# Patient Record
Sex: FEMALE
[De-identification: names, ages, dates, MRNs, and addresses within clinical notes are randomized per-mention and may not be internally consistent; named-entity substitution may affect disease eponyms.]

---

## 2020-06-08 ENCOUNTER — HOSPITAL ENCOUNTER (INPATIENT)
Dept: HOSPITAL 88 - ER | Age: 55
LOS: 3 days | Discharge: HOME | DRG: 639 | End: 2020-06-11
Attending: INTERNAL MEDICINE | Admitting: INTERNAL MEDICINE
Payer: COMMERCIAL

## 2020-06-08 VITALS — HEIGHT: 60 IN | WEIGHT: 200 LBS | BODY MASS INDEX: 39.27 KG/M2

## 2020-06-08 DIAGNOSIS — E11.10: Primary | ICD-10-CM

## 2020-06-08 DIAGNOSIS — E11.40: ICD-10-CM

## 2020-06-08 DIAGNOSIS — R51: ICD-10-CM

## 2020-06-08 DIAGNOSIS — Z79.4: ICD-10-CM

## 2020-06-08 DIAGNOSIS — I16.0: ICD-10-CM

## 2020-06-08 DIAGNOSIS — E66.9: ICD-10-CM

## 2020-06-08 LAB
ALBUMIN SERPL-MCNC: 3.7 G/DL (ref 3.5–5)
ALBUMIN/GLOB SERPL: 0.8 {RATIO} (ref 0.8–2)
ALP SERPL-CCNC: 151 IU/L (ref 40–150)
ALT SERPL-CCNC: 76 IU/L (ref 0–55)
AMYLASE SERPL-CCNC: 20 U/L (ref 25–125)
ANION GAP SERPL CALC-SCNC: 19 MMOL/L (ref 8–16)
BACTERIA URNS QL MICRO: (no result) /HPF
BASOPHILS # BLD AUTO: 0 10*3/UL (ref 0–0.1)
BASOPHILS NFR BLD AUTO: 0.4 % (ref 0–1)
BILIRUB UR QL: NEGATIVE
BUN SERPL-MCNC: 9 MG/DL (ref 7–26)
BUN/CREAT SERPL: 8 (ref 6–25)
CALCIUM SERPL-MCNC: 9.6 MG/DL (ref 8.4–10.2)
CHLORIDE SERPL-SCNC: 100 MMOL/L (ref 98–107)
CHOLEST SERPL-MCNC: 167 MD/DL (ref 0–199)
CHOLEST/HDLC SERPL: 2.6 {RATIO} (ref 3–3.6)
CK MB SERPL-MCNC: 0.7 NG/ML (ref 0–5)
CK SERPL-CCNC: 54 IU/L (ref 29–168)
CLARITY UR: (no result)
CO2 SERPL-SCNC: 21 MMOL/L (ref 22–29)
COLOR UR: YELLOW
DEPRECATED NEUTROPHILS # BLD AUTO: 6.9 10*3/UL (ref 2.1–6.9)
DEPRECATED RBC URNS MANUAL-ACNC: (no result) /HPF (ref 0–5)
EGFRCR SERPLBLD CKD-EPI 2021: 51 ML/MIN (ref 60–?)
EOSINOPHIL # BLD AUTO: 0.1 10*3/UL (ref 0–0.4)
EOSINOPHIL NFR BLD AUTO: 1.2 % (ref 0–6)
EPI CELLS URNS QL MICRO: (no result) /LPF
ERYTHROCYTE [DISTWIDTH] IN CORD BLOOD: 12.4 % (ref 11.7–14.4)
GLOBULIN PLAS-MCNC: 4.7 G/DL (ref 2.3–3.5)
GLUCOSE SERPLBLD-MCNC: 470 MG/DL (ref 74–118)
HCT VFR BLD AUTO: 43.6 % (ref 34.2–44.1)
HDLC SERPL-MSCNC: 65 MG/DL (ref 40–60)
HGB BLD-MCNC: 14.8 G/DL (ref 12–16)
KETONES UR QL STRIP.AUTO: (no result)
LDLC SERPL CALC-MCNC: 62 MG/DL (ref 60–130)
LEUKOCYTE ESTERASE UR QL STRIP.AUTO: (no result)
LIPASE SERPL-CCNC: 22 U/L (ref 8–78)
LYMPHOCYTES # BLD: 3 10*3/UL (ref 1–3.2)
LYMPHOCYTES NFR BLD AUTO: 27.9 % (ref 18–39.1)
MAGNESIUM SERPL-MCNC: 1.4 MG/DL (ref 1.3–2.1)
MCH RBC QN AUTO: 27.8 PG (ref 28–32)
MCHC RBC AUTO-ENTMCNC: 33.9 G/DL (ref 31–35)
MCV RBC AUTO: 82 FL (ref 81–99)
MONOCYTES # BLD AUTO: 0.6 10*3/UL (ref 0.2–0.8)
MONOCYTES NFR BLD AUTO: 5.2 % (ref 4.4–11.3)
NEUTS SEG NFR BLD AUTO: 65 % (ref 38.7–80)
NITRITE UR QL STRIP.AUTO: POSITIVE
PLATELET # BLD AUTO: 230 X10E3/UL (ref 140–360)
POTASSIUM SERPL-SCNC: 3 MMOL/L (ref 3.5–5.1)
PROT UR QL STRIP.AUTO: NEGATIVE
RBC # BLD AUTO: 5.32 X10E6/UL (ref 3.6–5.1)
SODIUM SERPL-SCNC: 137 MMOL/L (ref 136–145)
SP GR UR STRIP: 1.02 (ref 1.01–1.02)
TRIGL SERPL-MCNC: 202 MG/DL (ref 0–149)
UROBILINOGEN UR STRIP-MCNC: 0.2 MG/DL (ref 0.2–1)
WBC #/AREA URNS HPF: (no result) /HPF (ref 0–5)

## 2020-06-08 PROCEDURE — 83690 ASSAY OF LIPASE: CPT

## 2020-06-08 PROCEDURE — 82553 CREATINE MB FRACTION: CPT

## 2020-06-08 PROCEDURE — 84443 ASSAY THYROID STIM HORMONE: CPT

## 2020-06-08 PROCEDURE — 36415 COLL VENOUS BLD VENIPUNCTURE: CPT

## 2020-06-08 PROCEDURE — 80053 COMPREHEN METABOLIC PANEL: CPT

## 2020-06-08 PROCEDURE — 83036 HEMOGLOBIN GLYCOSYLATED A1C: CPT

## 2020-06-08 PROCEDURE — 84484 ASSAY OF TROPONIN QUANT: CPT

## 2020-06-08 PROCEDURE — 93005 ELECTROCARDIOGRAM TRACING: CPT

## 2020-06-08 PROCEDURE — 99284 EMERGENCY DEPT VISIT MOD MDM: CPT

## 2020-06-08 PROCEDURE — 85025 COMPLETE CBC W/AUTO DIFF WBC: CPT

## 2020-06-08 PROCEDURE — 70551 MRI BRAIN STEM W/O DYE: CPT

## 2020-06-08 PROCEDURE — 82150 ASSAY OF AMYLASE: CPT

## 2020-06-08 PROCEDURE — 84439 ASSAY OF FREE THYROXINE: CPT

## 2020-06-08 PROCEDURE — 87635 SARS-COV-2 COVID-19 AMP PRB: CPT

## 2020-06-08 PROCEDURE — 82948 REAGENT STRIP/BLOOD GLUCOSE: CPT

## 2020-06-08 PROCEDURE — 87086 URINE CULTURE/COLONY COUNT: CPT

## 2020-06-08 PROCEDURE — 80048 BASIC METABOLIC PNL TOTAL CA: CPT

## 2020-06-08 PROCEDURE — 82550 ASSAY OF CK (CPK): CPT

## 2020-06-08 PROCEDURE — 83880 ASSAY OF NATRIURETIC PEPTIDE: CPT

## 2020-06-08 PROCEDURE — 80061 LIPID PANEL: CPT

## 2020-06-08 PROCEDURE — 81001 URINALYSIS AUTO W/SCOPE: CPT

## 2020-06-08 PROCEDURE — 83735 ASSAY OF MAGNESIUM: CPT

## 2020-06-08 PROCEDURE — 71045 X-RAY EXAM CHEST 1 VIEW: CPT

## 2020-06-08 RX ADMIN — SODIUM CHLORIDE PRN MG: 900 INJECTION INTRAVENOUS at 20:13

## 2020-06-08 NOTE — DIAGNOSTIC IMAGING REPORT
EXAM:  CHEST SINGLE (NOT PORTABLE)



DATE: 6/8/2020 6:30 PM  



INDICATION: Dizziness, weakness   

^ERMD ORDER

^[id number]

^1830

^Y  



COMPARISON: None



FINDINGS:



Lines and tubes: None



Heart size normal allowing for low lung volumes.



No focal pulmonary opacity, pleural effusion or pneumothorax.



Upper abdomen unremarkable. No acute bony abnormality.



IMPRESSION:

No evidence for acute disease.



Signed by: Dr. Wade Rogers M.D. on 6/8/2020 8:09 PM

## 2020-06-08 NOTE — XMS REPORT
Continuity of Care Document

                             Created on: 2020



CHARLEE REYNOSO

External Reference #: [id number]

: 1965

Sex: Female



Demographics





                          Address                   [address] [place]  [address]

 

                          Home Phone                [phone]

 

                          Preferred Language        English

 

                          Marital Status            Unknown

 

                          Nondenominational Affiliation     Unknown

 

                          Race                      Unknown

 

                          Ethnic Group              Unknown





Author





                          Author                    [facility]

 

                          Organization              [facility]

 

                          Address                   [address]

[place]  [address]



 

                          Phone                     Unavailable







Support





                Name            Relationship    Address         Phone

 

                    FERNANDO PRADO      PRS                 [address]

[address]

[place]  [address]                     [phone]

 

                    FORREST PRADO      PRS                 [address]

[place], [place]  [address] [phone]

 

                    FERNANDO PRADO      PRS                 [address]

[address]

[place]  [address]                     [phone]

 

                    FORREST PRADO      PRS                 [address]

[place], [place]  [address] [phone]







Care Team Providers





                    Care Team Member Name Role                Phone

 

                    RONALD BELCHER MD Attphys             Unavailable

 

                    INFANTE,  CHARAN        Admphys             Unavailable







Payers





           Payer Name Policy Type Policy Number Effective Date Expiration Date S

ource







Problems

This patient has no known problems.



Allergies, Adverse Reactions, Alerts





        Allergy Name Allergy Type Status  Severity Reaction(s) Onset Date Inacti

ve Date 

Treating Clinician        Comments                  Source

 

       No Known Allergies DA     Active U             2016 00:00:00       

               [facility]







Medications

This patient has no known medications.



Procedures

This patient has no known procedures.



Results





           Test Description Test Time  Test Comments Results    Result Comments 

Source

 

                CHEST SINGLE (NOT PORTABLE) 2020 20:08:00                 

                              

                                                       [facility]                        [address], [place] [address]      Patient Name: CHARLEE REYNOSO                    
           MR #: [id number]                  : 1965                    
              Age/Sex: 55/F  Acct #: [id number]                              
Req #: [id number]  Adm Physician:                                               
      Ordered by: RONALD BELCHER MD, MD                         Report #: [id number]
[id number]        Location: ER                                      Room/Bed:         
         
________________________________________________________________________________

___________________    Procedure: [id number] DX/CHEST SINGLE (NOT PORTABLE)  Exam
Date: 20                            Exam Time:                        
                      REPORT STATUS: Signed    EXAM:  CHEST SINGLE (NOT 
PORTABLE)      DATE: 2020 6:30 PM        INDICATION: Dizziness, weakness    
  ERMD ORDER    2020    Y        COMPARISON: None      FINDINGS:    
 Lines and tubes: None      Heart size normal allowing for low lung volumes.    
 No focal pulmonary opacity, pleural effusion or pneumothorax.      Upper 
abdomen unremarkable. No acute bony abnormality.      IMPRESSION:   No evidence 
for acute disease.      Signed by: Dr. Arthur Rogers M.D. on 2020 8:09 PM  
     Dictated By: RATHUR ROGERS MD  Electronically Signed By: ARTHUR ROGERS MD 
on 20  Transcribed By: NUBIA on 20       COPY TO:   
RONALD BELCHER                                       

 

                GASTRIC,BIOPSY  2019-10-03 12:23:00                 

--------------------------------------------------------------------------------

------------RUN DATE: 10/03/19                         [facility]           
              PAGE 1   RUN TIME: 1223                            Specimen 
Inquiry                    RUN USER: INTERFACE                                  
                        
----------------------------------------------------------------
----------------------------PATIENT: CHARLEE OSUNA           ACCT #: 
[id number] LOC:  ALYSSA     U #: [id number]                                   
   AGE/SX: 54/F         ROOM: [facility]     RE19REG DR:  Corrie Cordero       :    [birth year]     BED:  A          DIS: 19               
                       STATUS: DIS IN       TLOC:           
----------------------------
---------------------------------------------------------------- SPEC #: 
[id number]     RECD:      STATUS:  [place]ANE           REQ #: 
[id number]                           [place]:      [facility] DR: 
Franko Joel MD    ENTERED:      SP TYPE: GASTRIC BX   
 OTHR DR: Darrian Sommers MD, David N MDORDERED:  GROSS                               
                                              COPIES TO:   Darrian Sommers MD   [address] [place] [address]   [phone]    Franko Joel MD   [address]   [place], [place] [address] [phone]    Eulogio Carrillo MD   
[address]   [place] [address]   [phone] MARKERS: 
INTRADEPARTMENTAL CONSULT PROCEDURES: GROSS () TISSUES:           
ANTRUM - NODULE BX COLD         CLINICAL HISTORY    COLLECTION DATE: 2019 
     ABDOMINAL PAIN, NAUSEA; VOMITING   POST-OP DIAGNOSIS: GASTRITIS, HIATAL 
HERNIA, GASTROPARESIS             COMMENT   Multiple levels of the tissue show 
an area in which the mucosal surface is intact.  Beneath the mucosal lining is 
an expanded are of inflamed granulation tissue.  No  discrete areas of mucosal 
erosion/ulceration are identified in the biopsy sample.  Features diagnostic of 
malignancy are not present.  Correlation is necessary.     Intradepartmental 
consultation: DMW.                                  ** CONTINUED ON NEXT PAGE **
 
--------------------------------------------------------------------------------

------------RUN DATE: 10/03/19                         [facility]           
              PAGE 2   RUN TIME: 1223                            Specimen 
Inquiry                    RUN USER: INTERFACE                                  
                        ------
--------------------------------------------------------------------------------

------SPEC #: [id number]    PATIENT: CHARLEE OSUNA            
#[id number]  (Continued)------------------------
--------------------------------------------------------------------          
FINAL DIAGNOSIS    Antral nodule, biopsy:        PROMINENT AREA OF INFLAMED 
GRANULATION TISSUE IN ANTRAL MUCOSA,           see comment        NEGATIVE FOR 
INTESTINAL METAPLASIA        NEGATIVE FOR HELICOBACTER ORGANISMS        NEGATIVE
FOR MALIGNANCY        MULTIPLE LEVELS EXAMINED           RRB/   D   [address], 
[address]           MACROSCOPIC    The specimen is received in formalin, labeled 
with the patient's name,   identified as "antrum nodule", and consists of tan 
biopsy tissue measuring 0.3   cm, submitted for H E and Giemsa stains.       
GROSS PERFORMED AT [facility]   [address] [place] [address]   [phone])[phone]       
   MICROSCOPIC    All of the stains, including any controls performed, stain 
appropriately.       MICROSCOPIC PERFORMED AT [facility]   [address] [place] [address]   [phone])[phone]
        PERFORMING SITE    Diagnosis performed at:        [facility]        [address] [place] [address]        [phone]
[phone]

---------------------- Signed SIGNATURE ON FILE                        
Nico De La Fuente MD 10/03/19 1223  ----------------
----------------------------------------------------------------------------    
                               ** END OF REPORT **                             

 

                    GLUBED              2019 04:55:00   

 

                                        Test Item

 

             GLUBED (test code = GLUBED) 84 mg/dL            N            

Performed by certified  at

[facility]





[id number] 21:30:00* 



             Test Item    Value        Reference Range Interpretation Comments

 

             GLUBED (test code = GLUBED) 133 mg/dL           H            

Performed by certified  

at [facility]





[id number] 17:36:00* 



             Test Item    Value        Reference Range Interpretation Comments

 

             GLUBED (test code = GLUBED) 127 mg/dL           H            

Performed by certified  

at [facility]





[id number] 13:00:00* 



             Test Item    Value        Reference Range Interpretation Comments

 

             GLUBED (test code = GLUBED) 206 mg/dL           H            

Performed by certified  

at [facility]





BASIC METABOLIC [id number] 07:36:00* 



             Test Item    Value        Reference Range Interpretation Comments

 

             SODIUM (test code = NA) 141 mmol/L   136-145      N             

 

             POTASSIUM (test code = K) 3.3 mmol/L   3.5-5.1      L             

 

             CHLORIDE (test code = CL) 115.0 mmol/L        H             

 

             CARBON DIOXIDE (test code = CO2) 19.0 mmol/L  21-32        L       

      

 

             ANION GAP (test code = GAP) 10.3         10-20        N            

 

 

             GLUCOSE (test code = GLU) 105 mg/dL           N             

 

             BLOOD UREA NITROGEN (test code = BUN) 2 mg/dL      7-18         L  

           

 

             GLOMERULAR FILTRATION RATE (test code = GFR) > 60 mL/min  >=60     

                 Estimated GFR by

using Modified MDRD formula.Chronic kidney disease is defined as either kidney 
damageor GFR <60 mL/min/1.73 m2 for >3 months.

 

             CREATININE (test code = CREAT) 0.50 mg/dL   0.55-1.02    L         

   **Note change in reference

range due to change in reagent.**

 

             BUN/CREATININE RATIO (test code = BUN/CREA) 3.7          10-20     

   L             

 

             CALCIUM (test code = CA) 8.4 mg/dL    8.5-10.1     L             





BASIC METABOLIC [id number] 07:31:00* 



             Test Item    Value        Reference Range Interpretation Comments

 

             SODIUM (test code = NA) 141 mmol/L   136-145      N             

 

             POTASSIUM (test code = K) 3.3 mmol/L   3.5-5.1      L             

 

             CHLORIDE (test code = CL) 115.0 mmol/L        H             

 

             CARBON DIOXIDE (test code = CO2)  mmol/L      21-32                

      

 

             ANION GAP (test code = GAP)              10-20                     

 

 

             GLUCOSE (test code = GLU)  mg/dL                            

 

             BLOOD UREA NITROGEN (test code = BUN)  mg/dL       7-18            

           

 

             GLOMERULAR FILTRATION RATE (test code = GFR)  mL/min      >=60     

                  

 

             CREATININE (test code = CREAT)  mg/dL       0.55-1.02              

    

 

             BUN/CREATININE RATIO (test code = BUN/CREA)              10-20     

                 

 

             CALCIUM (test code = CA)  mg/dL       8.5-10.1                   





CBC W/AUTO [id number] 07:13:00* 



             Test Item    Value        Reference Range Interpretation Comments

 

             WHITE BLOOD CELL (test code = WBC) 6.6 K/mm3    4.5-12.5     N     

        

 

             RED BLOOD CELL (test code = RBC) 4.52 mill/mm3 3.7-5.2      N      

       

 

             HEMOGLOBIN (test code = HGB) 13.0 gram/dL 11.5-15.5    N           

  

 

             HEMATOCRIT (test code = HCT) 39.5 %       36.0-46.0    N           

  

 

             MEAN CELL VOLUME (test code = MCV) 87.4 fL      80-98        N     

        

 

             MEAN CELL HGB (test code = MCH) 28.8 picogram 27.0-33.0    N       

      

 

             MEAN CELL HGB CONCETRATION (test code = MCHC) 32.9 gram/dL 33.0-36.

0    L             

 

             RED CELL DISTRIBUTION WIDTH (test code = RDW) 13.1 %       11.6-16.

2    N             

 

             RED CELL DISTRIBUTION WIDTH SD (test code = RDW-SD) 41.6 fL      37

.0-51.0    N             

 

             PLATELET COUNT (test code = PLT) 310 K/mm3    150-450      N       

      

 

             MEAN PLATELET VOLUME (test code = MPV) 9.9 fL       6.7-11.0     N 

            

 

             NEUTROPHIL % (test code = NT%) 51.9 %       39.0-69.0    N         

    

 

             IMMATURE GRANULOCYTE % (test code = IG%) 0.3 %        0.0-5.0      

N             

 

             LYMPHOCYTE % (test code = LY%) 36.2 %       25.0-55.0    N         

    

 

             MONOCYTE % (test code = MO%) 7.8 %        0.0-10.0     N           

  

 

             EOSINOPHIL % (test code = EO%) 3.5 %        0.0-5.0      N         

    

 

             BASOPHIL % (test code = BA%) 0.3 %        0.0-1.0      N           

  

 

             NUCLEATED RBC % (test code = NRBC%) 0.0 %        0-0          N    

         

 

             NEUTROPHIL # (test code = NT#) 3.42 K/mm3   1.8-7.7      N         

    

 

             IMMATURE GRANULOCYTE # (test code = IG#) 0.02 x10 3/uL 0-0.03      

 N             

 

             LYMPHOCYTE # (test code = LY#) 2.38 K/mm3   1.0-5.0      N         

    

 

             MONOCYTE # (test code = MO#) 0.51 K/mm3   0-0.8        N           

  

 

             EOSINOPHIL # (test code = EO#) 0.23 K/mm3   0.0-0.5      N         

    

 

             BASOPHIL # (test code = BA#) 0.02 K/mm3   0.0-0.2      N           

  

 

             NUCLEATED RBC # (test code = NRBC#) 0.00 K/mm3   0.0-0.1      N    

         





CBC W/AUTO [id number] 07:10:00* 



             Test Item    Value        Reference Range Interpretation Comments

 

             WHITE BLOOD CELL (test code = WBC)  K/mm3       4.5-12.5           

        

 

             RED BLOOD CELL (test code = RBC)  mill/mm3    3.7-5.2              

      

 

             HEMOGLOBIN (test code = HGB) 13.0 gram/dL 11.5-15.5    N           

  

 

             HEMATOCRIT (test code = HCT) 39.5 %       36.0-46.0    N           

  

 

             MEAN CELL VOLUME (test code = MCV)  fL          80-98              

        

 

             MEAN CELL HGB (test code = MCH)  picogram    27.0-33.0             

     

 

             MEAN CELL HGB CONCETRATION (test code = MCHC)  gram/dL     33.0-36.

0                  

 

             RED CELL DISTRIBUTION WIDTH (test code = RDW)  %           11.6-16.

2                  

 

             RED CELL DISTRIBUTION WIDTH SD (test code = RDW-SD)  fL          37

.0-51.0                  

 

             PLATELET COUNT (test code = PLT)  K/mm3       150-450              

      

 

             MEAN PLATELET VOLUME (test code = MPV)  fL          6.7-11.0       

            

 

             NEUTROPHIL % (test code = NT%)  %           39.0-69.0              

    

 

             IMMATURE GRANULOCYTE % (test code = IG%)  %           0.0-5.0      

              

 

             LYMPHOCYTE % (test code = LY%)  %           25.0-55.0              

    

 

             MONOCYTE % (test code = MO%)  %           0.0-10.0                 

  

 

             EOSINOPHIL % (test code = EO%)  %           0.0-5.0                

    

 

             BASOPHIL % (test code = BA%)  %           0.0-1.0                  

  

 

             NEUTROPHIL # (test code = NT#)  K/mm3       1.8-7.7                

    

 

             LYMPHOCYTE # (test code = LY#)  K/mm3       1.0-5.0                

    

 

             MONOCYTE # (test code = MO#)  K/mm3       0-0.8                    

  

 

             EOSINOPHIL # (test code = EO#)  K/mm3       0.0-0.5                

    

 

             BASOPHIL # (test code = BA#)  K/mm3       0.0-0.2                  

  





[id number] 05:02:00* 



             Test Item    Value        Reference Range Interpretation Comments

 

             GLUBED (test code = GLUBED) 92 mg/dL            N            

Performed by certified  at

[facility]





[id number] 20:59:00* 



             Test Item    Value        Reference Range Interpretation Comments

 

             GLUBED (test code = GLUBED) 150 mg/dL           H            

Performed by certified  

at [facility]





[id number] 17:54:00* 



             Test Item    Value        Reference Range Interpretation Comments

 

             GLUBED (test code = GLUBED) 136 mg/dL           H            

Performed by certified  

at [facility]





[id number] 11:49:00* 



             Test Item    Value        Reference Range Interpretation Comments

 

             GLUBED (test code = GLUBED) 102 mg/dL           N            

Performed by certified  

at [facility]





COMPREHENSIVE METABOLIC [id number] 07:10:00* 



             Test Item    Value        Reference Range Interpretation Comments

 

             SODIUM (test code = NA) 143 mmol/L   136-145      N             

 

             POTASSIUM (test code = K) 3.2 mmol/L   3.5-5.1      L             

 

             CHLORIDE (test code = CL) 116.0 mmol/L        H             

 

             CARBON DIOXIDE (test code = CO2) 22.0 mmol/L  21-32        N       

      

 

             ANION GAP (test code = GAP) 8.2          10-20        L            

 

 

             GLUCOSE (test code = GLU) 107 mg/dL           H             

 

             BLOOD UREA NITROGEN (test code = BUN) 1 mg/dL      7-18         L  

           

 

             GLOMERULAR FILTRATION RATE (test code = GFR) > 60 mL/min  >=60     

                 Estimated GFR by

using Modified MDRD formula.Chronic kidney disease is defined as either kidney 
damageor GFR <60 mL/min/1.73 m2 for >3 months.

 

             CREATININE (test code = CREAT) 0.60 mg/dL   0.55-1.02    N         

   **Note change in reference

range due to change in reagent.**

 

             BUN/CREATININE RATIO (test code = BUN/CREA) 1.6          10-20     

   L             

 

             TOTAL PROTEIN (test code = PROT) 6.1 gram/dL  6.4-8.2      L       

      

 

             ALBUMIN (test code = ALB) 1.9 g/dL     3.4-5.0      L             

 

             GLOBULIN (test code = GLOB) 4.2 gram/dL  2.7-4.2      N            

 

 

             ALBUMIN/GLOBULIN RATIO (test code = A/G) 0.5          0.75-1.50    

L             

 

             CALCIUM (test code = CA) 8.0 mg/dL    8.5-10.1     L             

 

             BILIRUBIN TOTAL (test code = BILT) 0.30 mg/dL   0.0-1.0      N     

        

 

             SGOT/AST (test code = AST) 29 IUnit/L   15-37        N             

 

             SGPT/ALT (test code = ALT) 23 IUnit/L   12-78        N             

 

             ALKALINE PHOSPHATASE TOTAL (test code = ALKP) 75 IUnit/L     

     N            **Note change 

in reference range due to change in reagent.**





[id number] 07:10:00* 



             Test Item    Value        Reference Range Interpretation Comments

 

             MAGNESIUM (test code = MAG) 1.9 mg/dL    1.8-2.4      N            

 





COMPREHENSIVE METABOLIC [id number] 07:04:00* 



             Test Item    Value        Reference Range Interpretation Comments

 

             SODIUM (test code = NA) 143 mmol/L   136-145      N             

 

             POTASSIUM (test code = K) 3.2 mmol/L   3.5-5.1      L             

 

             CHLORIDE (test code = CL) 116.0 mmol/L        H             

 

             CARBON DIOXIDE (test code = CO2)  mmol/L      21-32                

      

 

             ANION GAP (test code = GAP)              10-20                     

 

 

             GLUCOSE (test code = GLU)  mg/dL                            

 

             BLOOD UREA NITROGEN (test code = BUN)  mg/dL       7-18            

           

 

             GLOMERULAR FILTRATION RATE (test code = GFR)  mL/min      >=60     

                  

 

             CREATININE (test code = CREAT)  mg/dL       0.55-1.02              

    

 

             BUN/CREATININE RATIO (test code = BUN/CREA)              10-20     

                 

 

             TOTAL PROTEIN (test code = PROT)  gram/dL     6.4-8.2              

      

 

             ALBUMIN (test code = ALB)  g/dL        3.4-5.0                    

 

             GLOBULIN (test code = GLOB)  gram/dL     2.7-4.2                   

 

 

             ALBUMIN/GLOBULIN RATIO (test code = A/G)              0.75-1.50    

              

 

             CALCIUM (test code = CA)  mg/dL       8.5-10.1                   

 

             BILIRUBIN TOTAL (test code = BILT)  mg/dL       0.0-1.0            

        

 

             SGOT/AST (test code = AST)  IUnit/L     15-37                      

 

             SGPT/ALT (test code = ALT)  IUnit/L     12-78                      

 

             ALKALINE PHOSPHATASE TOTAL (test code = ALKP)  IUnit/L       

                   





[id number] 07:04:00* 



             Test Item    Value        Reference Range Interpretation Comments

 

             MAGNESIUM (test code = MAG)  mg/dL       1.8-2.4                   

 





CBC W/AUTO [id number] 06:49:00* 



             Test Item    Value        Reference Range Interpretation Comments

 

             WHITE BLOOD CELL (test code = WBC) 6.8 K/mm3    4.5-12.5     N     

        

 

             RED BLOOD CELL (test code = RBC) 4.26 mill/mm3 3.7-5.2      N      

       

 

             HEMOGLOBIN (test code = HGB) 12.2 gram/dL 11.5-15.5    N           

  

 

             HEMATOCRIT (test code = HCT) 37.8 %       36.0-46.0    N           

  

 

             MEAN CELL VOLUME (test code = MCV) 88.7 fL      80-98        N     

        

 

             MEAN CELL HGB (test code = MCH) 28.6 picogram 27.0-33.0    N       

      

 

             MEAN CELL HGB CONCETRATION (test code = MCHC) 32.3 gram/dL 33.0-36.

0    L             

 

             RED CELL DISTRIBUTION WIDTH (test code = RDW) 13.1 %       11.6-16.

2    N             

 

             RED CELL DISTRIBUTION WIDTH SD (test code = RDW-SD) 41.5 fL      37

.0-51.0    N             

 

             PLATELET COUNT (test code = PLT) 290 K/mm3    150-450      N       

      

 

             MEAN PLATELET VOLUME (test code = MPV) 9.8 fL       6.7-11.0     N 

            

 

             NEUTROPHIL % (test code = NT%) 56.7 %       39.0-69.0    N         

    

 

             IMMATURE GRANULOCYTE % (test code = IG%) 1.2 %        0.0-5.0      

N             

 

             LYMPHOCYTE % (test code = LY%) 31.0 %       25.0-55.0    N         

    

 

             MONOCYTE % (test code = MO%) 6.9 %        0.0-10.0     N           

  

 

             EOSINOPHIL % (test code = EO%) 3.8 %        0.0-5.0      N         

    

 

             BASOPHIL % (test code = BA%) 0.4 %        0.0-1.0      N           

  

 

             NUCLEATED RBC % (test code = NRBC%) 0.0 %        0-0          N    

         

 

             NEUTROPHIL # (test code = NT#) 3.85 K/mm3   1.8-7.7      N         

    

 

             IMMATURE GRANULOCYTE # (test code = IG#) 0.08 x10 3/uL 0-0.03      

 H             

 

             LYMPHOCYTE # (test code = LY#) 2.11 K/mm3   1.0-5.0      N         

    

 

             MONOCYTE # (test code = MO#) 0.47 K/mm3   0-0.8        N           

  

 

             EOSINOPHIL # (test code = EO#) 0.26 K/mm3   0.0-0.5      N         

    

 

             BASOPHIL # (test code = BA#) 0.03 K/mm3   0.0-0.2      N           

  

 

             NUCLEATED RBC # (test code = NRBC#) 0.00 K/mm3   0.0-0.1      N    

         





[id number] 05:00:00* 



             Test Item    Value        Reference Range Interpretation Comments

 

             GLUBED (test code = GLUBED) 96 mg/dL            N            

Performed by certified  at

[facility]





[id number] 20:49:00* 



             Test Item    Value        Reference Range Interpretation Comments

 

             GLUBED (test code = GLUBED) 119 mg/dL           H            

Performed by certified  

at [facility]





[id number] 16:17:00* 



             Test Item    Value        Reference Range Interpretation Comments

 

             GLUBED (test code = GLUBED) 157 mg/dL           H            

Performed by certified  

at [facility]





[id number] 11:31:00* 



             Test Item    Value        Reference Range Interpretation Comments

 

             GLUBED (test code = GLUBED) 128 mg/dL           H            

Performed by certified  

at [facility]





COMPREHENSIVE METABOLIC [id number] 08:42:00* 



             Test Item    Value        Reference Range Interpretation Comments

 

             SODIUM (test code = NA) 144 mmol/L   136-145      N             

 

             POTASSIUM (test code = K) 3.0 mmol/L   3.5-5.1      L            RE

SULT VERIFIED BY REPEAT 

ANALYSIS

 

             CHLORIDE (test code = CL) 113.0 mmol/L        H             

 

             CARBON DIOXIDE (test code = CO2) 26.0 mmol/L  21-32        N       

      

 

             ANION GAP (test code = GAP) 8.0          10-20        L            

 

 

             GLUCOSE (test code = GLU) 114 mg/dL           H             

 

             BLOOD UREA NITROGEN (test code = BUN) 2 mg/dL      7-18         L  

           

 

             GLOMERULAR FILTRATION RATE (test code = GFR) > 60 mL/min  >=60     

                 Estimated GFR by

using Modified MDRD formula.Chronic kidney disease is defined as either kidney 
damageor GFR <60 mL/min/1.73 m2 for >3 months.

 

             CREATININE (test code = CREAT) 0.60 mg/dL   0.55-1.02    N         

   **Note change in reference

range due to change in reagent.**

 

             BUN/CREATININE RATIO (test code = BUN/CREA) 3.2          10-20     

   L             

 

             TOTAL PROTEIN (test code = PROT) 6.6 gram/dL  6.4-8.2      N       

      

 

             ALBUMIN (test code = ALB) 2.1 g/dL     3.4-5.0      L             

 

             GLOBULIN (test code = GLOB) 4.5 gram/dL  2.7-4.2      H            

 

 

             ALBUMIN/GLOBULIN RATIO (test code = A/G) 0.5          0.75-1.50    

L             

 

             CALCIUM (test code = CA) 8.3 mg/dL    8.5-10.1     L             

 

             BILIRUBIN TOTAL (test code = BILT) 0.30 mg/dL   0.0-1.0      N     

        

 

             SGOT/AST (test code = AST) 30 IUnit/L   15-37        N             

 

             SGPT/ALT (test code = ALT) 22 IUnit/L   12-78        N             

 

             ALKALINE PHOSPHATASE TOTAL (test code = ALKP) 84 IUnit/L     

     N            **Note change 

in reference range due to change in reagent.**





[id number] 08:42:00* 



             Test Item    Value        Reference Range Interpretation Comments

 

             MAGNESIUM (test code = MAG) 1.5 mg/dL    1.8-2.4      L            

 





COMPREHENSIVE METABOLIC [id number] 08:36:00* 



             Test Item    Value        Reference Range Interpretation Comments

 

             SODIUM (test code = NA) 144 mmol/L   136-145      N             

 

             POTASSIUM (test code = K) 3.0 mmol/L   3.5-5.1      L            RE

SULT VERIFIED BY REPEAT 

ANALYSIS

 

             CHLORIDE (test code = CL) 113.0 mmol/L        H             

 

             CARBON DIOXIDE (test code = CO2)  mmol/L      21-32                

      

 

             ANION GAP (test code = GAP)              10-20                     

 

 

             GLUCOSE (test code = GLU)  mg/dL                            

 

             BLOOD UREA NITROGEN (test code = BUN)  mg/dL       7-18            

           

 

             GLOMERULAR FILTRATION RATE (test code = GFR)  mL/min      >=60     

                  

 

             CREATININE (test code = CREAT)  mg/dL       0.55-1.02              

    

 

             BUN/CREATININE RATIO (test code = BUN/CREA)              10-20     

                 

 

             TOTAL PROTEIN (test code = PROT)  gram/dL     6.4-8.2              

      

 

             ALBUMIN (test code = ALB)  g/dL        3.4-5.0                    

 

             GLOBULIN (test code = GLOB)  gram/dL     2.7-4.2                   

 

 

             ALBUMIN/GLOBULIN RATIO (test code = A/G)              0.75-1.50    

              

 

             CALCIUM (test code = CA)  mg/dL       8.5-10.1                   

 

             BILIRUBIN TOTAL (test code = BILT)  mg/dL       0.0-1.0            

        

 

             SGOT/AST (test code = AST)  IUnit/L     15-37                      

 

             SGPT/ALT (test code = ALT)  IUnit/L     12-78                      

 

             ALKALINE PHOSPHATASE TOTAL (test code = ALKP)  IUnit/L       

                   





[id number] 08:36:00* 



             Test Item    Value        Reference Range Interpretation Comments

 

             MAGNESIUM (test code = MAG)  mg/dL       1.8-2.4                   

 





CBC W/AUTO [id number] 08:25:00* 



             Test Item    Value        Reference Range Interpretation Comments

 

             WHITE BLOOD CELL (test code = WBC) 8.4 K/mm3    4.5-12.5     N     

        

 

             RED BLOOD CELL (test code = RBC) 4.52 mill/mm3 3.7-5.2      N      

       

 

             HEMOGLOBIN (test code = HGB) 13.1 gram/dL 11.5-15.5    N           

  

 

             HEMATOCRIT (test code = HCT) 38.6 %       36.0-46.0    N           

  

 

             MEAN CELL VOLUME (test code = MCV) 85.4 fL      80-98        N     

        

 

             MEAN CELL HGB (test code = MCH) 29.0 picogram 27.0-33.0    N       

      

 

             MEAN CELL HGB CONCETRATION (test code = MCHC) 33.9 gram/dL 33.0-36.

0    N             

 

             RED CELL DISTRIBUTION WIDTH (test code = RDW) 12.6 %       11.6-16.

2    N             

 

             RED CELL DISTRIBUTION WIDTH SD (test code = RDW-SD) 38.9 fL      37

.0-51.0    N             

 

             PLATELET COUNT (test code = PLT) 287 K/mm3    150-450      N       

      

 

             MEAN PLATELET VOLUME (test code = MPV) 10.2 fL      6.7-11.0     N 

            

 

             NEUTROPHIL % (test code = NT%) 56.0 %       39.0-69.0    N         

    

 

             IMMATURE GRANULOCYTE % (test code = IG%) 0.7 %        0.0-5.0      

N             

 

             LYMPHOCYTE % (test code = LY%) 31.9 %       25.0-55.0    N         

    

 

             MONOCYTE % (test code = MO%) 7.1 %        0.0-10.0     N           

  

 

             EOSINOPHIL % (test code = EO%) 3.9 %        0.0-5.0      N         

    

 

             BASOPHIL % (test code = BA%) 0.4 %        0.0-1.0      N           

  

 

             NUCLEATED RBC % (test code = NRBC%) 0.0 %        0-0          N    

         

 

             NEUTROPHIL # (test code = NT#) 4.71 K/mm3   1.8-7.7      N         

    

 

             IMMATURE GRANULOCYTE # (test code = IG#) 0.06 x10 3/uL 0-0.03      

 H             

 

             LYMPHOCYTE # (test code = LY#) 2.68 K/mm3   1.0-5.0      N         

    

 

             MONOCYTE # (test code = MO#) 0.60 K/mm3   0-0.8        N           

  

 

             EOSINOPHIL # (test code = EO#) 0.33 K/mm3   0.0-0.5      N         

    

 

             BASOPHIL # (test code = BA#) 0.03 K/mm3   0.0-0.2      N           

  

 

             NUCLEATED RBC # (test code = NRBC#) 0.00 K/mm3   0.0-0.1      N    

         





CBC W/AUTO [id number] 08:23:00* 



             Test Item    Value        Reference Range Interpretation Comments

 

             WHITE BLOOD CELL (test code = WBC)  K/mm3       4.5-12.5           

        

 

             RED BLOOD CELL (test code = RBC)  mill/mm3    3.7-5.2              

      

 

             HEMOGLOBIN (test code = HGB) 13.1 gram/dL 11.5-15.5    N           

  

 

             HEMATOCRIT (test code = HCT) 38.6 %       36.0-46.0    N           

  

 

             MEAN CELL VOLUME (test code = MCV)  fL          80-98              

        

 

             MEAN CELL HGB (test code = MCH)  picogram    27.0-33.0             

     

 

             MEAN CELL HGB CONCETRATION (test code = MCHC)  gram/dL     33.0-36.

0                  

 

             RED CELL DISTRIBUTION WIDTH (test code = RDW)  %           11.6-16.

2                  

 

             RED CELL DISTRIBUTION WIDTH SD (test code = RDW-SD)  fL          37

.0-51.0                  

 

             PLATELET COUNT (test code = PLT)  K/mm3       150-450              

      

 

             MEAN PLATELET VOLUME (test code = MPV)  fL          6.7-11.0       

            

 

             NEUTROPHIL % (test code = NT%)  %           39.0-69.0              

    

 

             IMMATURE GRANULOCYTE % (test code = IG%)  %           0.0-5.0      

              

 

             LYMPHOCYTE % (test code = LY%)  %           25.0-55.0              

    

 

             MONOCYTE % (test code = MO%)  %           0.0-10.0                 

  

 

             EOSINOPHIL % (test code = EO%)  %           0.0-5.0                

    

 

             BASOPHIL % (test code = BA%)  %           0.0-1.0                  

  

 

             NEUTROPHIL # (test code = NT#)  K/mm3       1.8-7.7                

    

 

             LYMPHOCYTE # (test code = LY#)  K/mm3       1.0-5.0                

    

 

             MONOCYTE # (test code = MO#)  K/mm3       0-0.8                    

  

 

             EOSINOPHIL # (test code = EO#)  K/mm3       0.0-0.5                

    

 

             BASOPHIL # (test code = BA#)  K/mm3       0.0-0.2                  

  





[id number] 05:32:00* 



             Test Item    Value        Reference Range Interpretation Comments

 

             GLUBED (test code = GLUBED) 112 mg/dL           H            

Performed by certified  

at [facility]





[id number] 21:07:00* 



             Test Item    Value        Reference Range Interpretation Comments

 

             GLUBED (test code = GLUBED) 134 mg/dL           H            

Performed by certified  

at [facility]2019-09-22 16:30:00* 



             Test Item    Value        Reference Range Interpretation Comments

 

             GLUBED (test code = GLUBED) 94 mg/dL            N            

Performed by certified  at

[facility]2019-09-22 12:00:00* 



             Test Item    Value        Reference Range Interpretation Comments

 

             GLUBED (test code = GLUBED) 102 mg/dL           N            

Performed by certified  

at [facility]2019-09-22 06:43:00* 



             Test Item    Value        Reference Range Interpretation Comments

 

             GLUBED (test code = GLUBED) 108 mg/dL           H            

Performed by certified  

at [facility]2019-09-21 20:42:00* 



             Test Item    Value        Reference Range Interpretation Comments

 

             GLUBED (test code = GLUBED) 137 mg/dL           H            

Performed by certified  

at [facility]





[id number] 16:58:00* 



             Test Item    Value        Reference Range Interpretation Comments

 

             GLUBED (test code = GLUBED) 199 mg/dL           H            

Performed by certified  

at [facility]





[id number] 16:58:00* 



             Test Item    Value        Reference Range Interpretation Comments

 

             GLUBED (test code = GLUBED) 101 mg/dL           N            

Performed by certified  

at [facility]





[id number] 16:58:00* 



             Test Item    Value        Reference Range Interpretation Comments

 

             GLUBED (test code = GLUBED) 106 mg/dL           N            

Performed by certified  

at [facility]





COMPREHENSIVE METABOLIC [id number] 08:23:00* 



             Test Item    Value        Reference Range Interpretation Comments

 

             SODIUM (test code = NA) 142 mmol/L   136-145      N             

 

             POTASSIUM (test code = K) 3.0 mmol/L   3.5-5.1      L             

 

             CHLORIDE (test code = CL) 111.0 mmol/L        H             

 

             CARBON DIOXIDE (test code = CO2) 24.0 mmol/L  21-32        N       

      

 

             ANION GAP (test code = GAP) 10.0         10-20        N            

 

 

             GLUCOSE (test code = GLU) 101 mg/dL           N             

 

             BLOOD UREA NITROGEN (test code = BUN) 4 mg/dL      7-18         L  

           

 

             GLOMERULAR FILTRATION RATE (test code = GFR) > 60 mL/min  >=60     

                 Estimated GFR by

using Modified MDRD formula.Chronic kidney disease is defined as either kidney 
damageor GFR <60 mL/min/1.73 m2 for >3 months.

 

             CREATININE (test code = CREAT) 0.60 mg/dL   0.55-1.02    N         

   **Note change in reference

range due to change in reagent.**

 

             BUN/CREATININE RATIO (test code = BUN/CREA) 7.2          10-20     

   L             

 

             TOTAL PROTEIN (test code = PROT) 6.3 gram/dL  6.4-8.2      L       

      

 

             ALBUMIN (test code = ALB) 1.8 g/dL     3.4-5.0      L             

 

             GLOBULIN (test code = GLOB) 4.5 gram/dL  2.7-4.2      H            

 

 

             ALBUMIN/GLOBULIN RATIO (test code = A/G) 0.4          0.75-1.50    

L             

 

             CALCIUM (test code = CA) 8.2 mg/dL    8.5-10.1     L             

 

             BILIRUBIN TOTAL (test code = BILT) 0.30 mg/dL   0.0-1.0      N     

        

 

             SGOT/AST (test code = AST) 36 IUnit/L   15-37        N             

 

             SGPT/ALT (test code = ALT) 26 IUnit/L   12-78        N             

 

             ALKALINE PHOSPHATASE TOTAL (test code = ALKP) 91 IUnit/L     

     N            **Note change 

in reference range due to change in reagent.**





COMPREHENSIVE METABOLIC [id number] 08:17:00* 



             Test Item    Value        Reference Range Interpretation Comments

 

             SODIUM (test code = NA) 142 mmol/L   136-145      N             

 

             POTASSIUM (test code = K) 3.0 mmol/L   3.5-5.1      L             

 

             CHLORIDE (test code = CL) 111.0 mmol/L        H             

 

             CARBON DIOXIDE (test code = CO2)  mmol/L      21-32                

      

 

             ANION GAP (test code = GAP)              10-20                     

 

 

             GLUCOSE (test code = GLU)  mg/dL                            

 

             BLOOD UREA NITROGEN (test code = BUN)  mg/dL       7-18            

           

 

             GLOMERULAR FILTRATION RATE (test code = GFR)  mL/min      >=60     

                  

 

             CREATININE (test code = CREAT)  mg/dL       0.55-1.02              

    

 

             BUN/CREATININE RATIO (test code = BUN/CREA)              10-20     

                 

 

             TOTAL PROTEIN (test code = PROT)  gram/dL     6.4-8.2              

      

 

             ALBUMIN (test code = ALB)  g/dL        3.4-5.0                    

 

             GLOBULIN (test code = GLOB)  gram/dL     2.7-4.2                   

 

 

             ALBUMIN/GLOBULIN RATIO (test code = A/G)              0.75-1.50    

              

 

             CALCIUM (test code = CA)  mg/dL       8.5-10.1                   

 

             BILIRUBIN TOTAL (test code = BILT)  mg/dL       0.0-1.0            

        

 

             SGOT/AST (test code = AST)  IUnit/L     15-37                      

 

             SGPT/ALT (test code = ALT)  IUnit/L     12-78                      

 

             ALKALINE PHOSPHATASE TOTAL (test code = ALKP)  IUnit/L       

                   





CBC W/AUTO [id number] 08:04:00* 



             Test Item    Value        Reference Range Interpretation Comments

 

             WHITE BLOOD CELL (test code = WBC) 8.1 K/mm3    4.5-12.5     N     

        

 

             RED BLOOD CELL (test code = RBC) 4.56 mill/mm3 3.7-5.2      N      

       

 

             HEMOGLOBIN (test code = HGB) 13.0 gram/dL 11.5-15.5    N           

  

 

             HEMATOCRIT (test code = HCT) 39.6 %       36.0-46.0    N           

  

 

             MEAN CELL VOLUME (test code = MCV) 86.8 fL      80-98        N     

        

 

             MEAN CELL HGB (test code = MCH) 28.5 picogram 27.0-33.0    N       

      

 

             MEAN CELL HGB CONCETRATION (test code = MCHC) 32.8 gram/dL 33.0-36.

0    L             

 

             RED CELL DISTRIBUTION WIDTH (test code = RDW) 12.5 %       11.6-16.

2    N             

 

             RED CELL DISTRIBUTION WIDTH SD (test code = RDW-SD) 39.8 fL      37

.0-51.0    N             

 

             PLATELET COUNT (test code = PLT) 257 K/mm3    150-450      N       

      

 

             MEAN PLATELET VOLUME (test code = MPV) 10.7 fL      6.7-11.0     N 

            

 

             NEUTROPHIL % (test code = NT%) 63.6 %       39.0-69.0    N         

    

 

             IMMATURE GRANULOCYTE % (test code = IG%) 0.4 %        0.0-5.0      

N             

 

             LYMPHOCYTE % (test code = LY%) 26.1 %       25.0-55.0    N         

    

 

             MONOCYTE % (test code = MO%) 6.4 %        0.0-10.0     N           

  

 

             EOSINOPHIL % (test code = EO%) 3.3 %        0.0-5.0      N         

    

 

             BASOPHIL % (test code = BA%) 0.2 %        0.0-1.0      N           

  

 

             NUCLEATED RBC % (test code = NRBC%) 0.0 %        0-0          N    

         

 

             NEUTROPHIL # (test code = NT#) 5.12 K/mm3   1.8-7.7      N         

    

 

             IMMATURE GRANULOCYTE # (test code = IG#) 0.03 x10 3/uL 0-0.03      

 N             

 

             LYMPHOCYTE # (test code = LY#) 2.11 K/mm3   1.0-5.0      N         

    

 

             MONOCYTE # (test code = MO#) 0.52 K/mm3   0-0.8        N           

  

 

             EOSINOPHIL # (test code = EO#) 0.27 K/mm3   0.0-0.5      N         

    

 

             BASOPHIL # (test code = BA#) 0.02 K/mm3   0.0-0.2      N           

  

 

             NUCLEATED RBC # (test code = NRBC#) 0.00 K/mm3   0.0-0.1      N    

         

 

             MANUAL DIFF REQUIRED (test code = MDIFF) NO                        

              





[id number] 20:47:00* 



             Test Item    Value        Reference Range Interpretation Comments

 

             GLUBED (test code = GLUBED) 142 mg/dL           H            

Performed by certified  

at [facility]





BASIC METABOLIC [id number] 19:15:00* 



             Test Item    Value        Reference Range Interpretation Comments

 

             SODIUM (test code = NA) 144 mmol/L   136-145      N             

 

             POTASSIUM (test code = K) 3.3 mmol/L   3.5-5.1      L             

 

             CHLORIDE (test code = CL) 110.0 mmol/L        H             

 

             CARBON DIOXIDE (test code = CO2) 25.0 mmol/L  21-32        N       

      

 

             ANION GAP (test code = GAP) 12.3         10-20        N            

 

 

             GLUCOSE (test code = GLU) 99 mg/dL            N             

 

             BLOOD UREA NITROGEN (test code = BUN) 5 mg/dL      7-18         L  

           

 

             GLOMERULAR FILTRATION RATE (test code = GFR) > 60 mL/min  >=60     

                 Estimated GFR by

using Modified MDRD formula.Chronic kidney disease is defined as either kidney 
damageor GFR <60 mL/min/1.73 m2 for >3 months.

 

             CREATININE (test code = CREAT) 0.50 mg/dL   0.55-1.02    L         

   **Note change in reference

range due to change in reagent.**

 

             BUN/CREATININE RATIO (test code = BUN/CREA) 10.1         10-20     

   N             

 

             CALCIUM (test code = CA) 8.2 mg/dL    8.5-10.1     L             





19 [id number] 16:58:00* 



             Test Item    Value        Reference Range Interpretation Comments

 

             GLUBED (test code = GLUBED) 112 mg/dL           H            

Performed by certified  

at [facility]





[id number] 12:27:00* 



             Test Item    Value        Reference Range Interpretation Comments

 

             GLUBED (test code = GLUBED) 135 mg/dL           H            

Performed by certified  

at [facility]





- NM GASTRIC [id number] 11:13:00 FAX: Patricia Abrams MD   
[phone]    [place]:    St: [facility] FAX: Eulogio Smith MD      
[phone]   ----------------------------------------
---------------------------------------  Name:   CHARLEE OSUNA            
 [facility]                     : 1965  Age/S: 54/F           [address] [facility]                Unit #: [id number]      Loc: [id number]       [place]  [address]              Phys: Eulogio Carrillo MD                                    
                Acct: [id number] Dis Date:               Status: ADM IN       
                         PHONE #: [phone]     Exam Date:     2019    
1100                   FAX #: [phone]     Reason: intractable emesis       
                         EXAMS:                                               
CPT CODE:      [id number] NM GASTRIC EMPTYING                        67969       
            HISTORY: intractable emesis               EXAM: NUCLEAR MEDICINE 
GASTRIC EMPTYING STUDY.               COMPARISON: No relevant prior             
 TECHNIQUE:  After patient ingested 1 mCi TC 99m sulfur colloid mixed       with
oatmeal, sequential imaging was acquired over the anterior       abdomen for 90 
minutes.               FINDINGS:       There is no significant gastric emptying 
appreciated during the study.               T 1/2 for gastric emptying was un
able to be calculated due to the       absence of any appreciable gastric emptyi
ng.                         IMPRESSION: No appreciable gastric emptying may be s
een with either         gastroparesis or gastric outlet obstruction.          **
Electronically Signed by Stephen Jauregui MD on 2019 at 1113 **                
     Reported and signed by: Stephen Jauregui MD                    CC: Cole Peña MD; Eulogio Carrillo MD                                                        
                                            Technologist: Jenny Haley RT(N)   
                                Trnscrd Date/Time/By: 2019 (1113) : By: 
[email or web address]          Orig Print D/T: S: 2019 (1116)                        
PAGE  1                       Signed Report                               GLUBED
2019 06:25:00* 



             Test Item    Value        Reference Range Interpretation Comments

 

             GLUBED (test code = GLUBED) 108 mg/dL           H            

Performed by certified  

at [facility]





[id number] 20:37:00* 



             Test Item    Value        Reference Range Interpretation Comments

 

             GLUBED (test code = GLUBED) 119 mg/dL           H            

Performed by certified  

at [facility]





[id number] 17:15:00* 



             Test Item    Value        Reference Range Interpretation Comments

 

             GLUBED (test code = GLUBED) 108 mg/dL           H            

Performed by certified  

at [facility]





[id number] 13:12:00* 



             Test Item    Value        Reference Range Interpretation Comments

 

             GLUBED (test code = GLUBED) 125 mg/dL           H            

Performed by certified  

at [facility]





COMPREHENSIVE METABOLIC [id number] 11:06:00* 



             Test Item    Value        Reference Range Interpretation Comments

 

             SODIUM (test code = NA) 141 mmol/L   136-145      N             

 

             POTASSIUM (test code = K) 3.0 mmol/L   3.5-5.1      L             

 

             CHLORIDE (test code = CL) 107.0 mmol/L        N             

 

             CARBON DIOXIDE (test code = CO2) 26.0 mmol/L  21-32        N       

      

 

             ANION GAP (test code = GAP) 11.0         10-20        N            

 

 

             GLUCOSE (test code = GLU) 131 mg/dL           H             

 

             BLOOD UREA NITROGEN (test code = BUN) 9 mg/dL      7-18         N  

           

 

             GLOMERULAR FILTRATION RATE (test code = GFR) > 60 mL/min  >=60     

                 Estimated GFR by

using Modified MDRD formula.Chronic kidney disease is defined as either kidney 
damageor GFR <60 mL/min/1.73 m2 for >3 months.

 

             CREATININE (test code = CREAT) 0.60 mg/dL   0.55-1.02    N         

   **Note change in reference

range due to change in reagent.**

 

             BUN/CREATININE RATIO (test code = BUN/CREA) 14.7         10-20     

   N             

 

             TOTAL PROTEIN (test code = PROT) 6.3 gram/dL  6.4-8.2      L       

      

 

             ALBUMIN (test code = ALB) 1.8 g/dL     3.4-5.0      L             

 

             GLOBULIN (test code = GLOB) 4.5 gram/dL  2.7-4.2      H            

 

 

             ALBUMIN/GLOBULIN RATIO (test code = A/G) 0.4          0.75-1.50    

L             

 

             CALCIUM (test code = CA) 8.7 mg/dL    8.5-10.1     N             

 

             BILIRUBIN TOTAL (test code = BILT) 0.50 mg/dL   0.0-1.0      N     

        

 

             SGOT/AST (test code = AST) 27 IUnit/L   15-37        N             

 

             SGPT/ALT (test code = ALT) 26 IUnit/L   12-78        N             

 

             ALKALINE PHOSPHATASE TOTAL (test code = ALKP) 99 IUnit/L     

     N            **Note change 

in reference range due to change in reagent.**





PT WENT TO PROCEDURE PER MIRIAM SANFORD([id number]) @V.LAB./ 0848COMPREHENSIVE
METABOLIC [id number] 10:58:00* 



             Test Item    Value        Reference Range Interpretation Comments

 

             SODIUM (test code = NA) 141 mmol/L   136-145      N             

 

             POTASSIUM (test code = K) 3.0 mmol/L   3.5-5.1      L             

 

             CHLORIDE (test code = CL) 107.0 mmol/L        N             

 

             CARBON DIOXIDE (test code = CO2)  mmol/L      21-32                

      

 

             ANION GAP (test code = GAP)              10-20                     

 

 

             GLUCOSE (test code = GLU)  mg/dL                            

 

             BLOOD UREA NITROGEN (test code = BUN)  mg/dL       7-18            

           

 

             GLOMERULAR FILTRATION RATE (test code = GFR)  mL/min      >=60     

                  

 

             CREATININE (test code = CREAT)  mg/dL       0.55-1.02              

    

 

             BUN/CREATININE RATIO (test code = BUN/CREA)              10-20     

                 

 

             TOTAL PROTEIN (test code = PROT)  gram/dL     6.4-8.2              

      

 

             ALBUMIN (test code = ALB)  g/dL        3.4-5.0                    

 

             GLOBULIN (test code = GLOB)  gram/dL     2.7-4.2                   

 

 

             ALBUMIN/GLOBULIN RATIO (test code = A/G)              0.75-1.50    

              

 

             CALCIUM (test code = CA)  mg/dL       8.5-10.1                   

 

             BILIRUBIN TOTAL (test code = BILT)  mg/dL       0.0-1.0            

        

 

             SGOT/AST (test code = AST)  IUnit/L     15-37                      

 

             SGPT/ALT (test code = ALT)  IUnit/L     12-78                      

 

             ALKALINE PHOSPHATASE TOTAL (test code = ALKP)  IUnit/L       

                   





PT WENT TO PROCEDURE PER MIRIAM SANFORD([id number]) @V.LAB.SP 0848CBC W/AUTO 
[id number] 10:37:00* 



             Test Item    Value        Reference Range Interpretation Comments

 

             WHITE BLOOD CELL (test code = WBC) 11.3 K/mm3   4.5-12.5     N     

        

 

             RED BLOOD CELL (test code = RBC) 4.26 mill/mm3 3.7-5.2      N      

       

 

             HEMOGLOBIN (test code = HGB) 12.2 gram/dL 11.5-15.5                

 RESULT VERIFIED BY REPEAT 

ANALYSIS

 

             HEMATOCRIT (test code = HCT) 37.7 %       36.0-46.0    N           

  

 

             MEAN CELL VOLUME (test code = MCV) 88.5 fL      80-98        N     

        

 

             MEAN CELL HGB (test code = MCH) 28.6 picogram 27.0-33.0    N       

      

 

             MEAN CELL HGB CONCETRATION (test code = MCHC) 32.4 gram/dL 33.0-36.

0    L             

 

             RED CELL DISTRIBUTION WIDTH (test code = RDW) 12.6 %       11.6-16.

2    N             

 

             RED CELL DISTRIBUTION WIDTH SD (test code = RDW-SD) 41.1 fL      37

.0-51.0    N             

 

             PLATELET COUNT (test code = PLT) 220 K/mm3    150-450      N       

      

 

             MEAN PLATELET VOLUME (test code = MPV) 10.7 fL      6.7-11.0     N 

            

 

             NEUTROPHIL % (test code = NT%) 80.7 %       39.0-69.0    H         

    

 

             IMMATURE GRANULOCYTE % (test code = IG%) 0.6 %        0.0-5.0      

N             

 

             LYMPHOCYTE % (test code = LY%) 10.9 %       25.0-55.0    L         

    

 

             MONOCYTE % (test code = MO%) 5.1 %        0.0-10.0     N           

  

 

             EOSINOPHIL % (test code = EO%) 2.5 %        0.0-5.0      N         

    

 

             BASOPHIL % (test code = BA%) 0.2 %        0.0-1.0      N           

  

 

             NUCLEATED RBC % (test code = NRBC%) 0.0 %        0-0          N    

         

 

             NEUTROPHIL # (test code = NT#) 9.08 K/mm3   1.8-7.7      H         

    

 

             IMMATURE GRANULOCYTE # (test code = IG#) 0.07 x10 3/uL 0-0.03      

 H             

 

             LYMPHOCYTE # (test code = LY#) 1.23 K/mm3   1.0-5.0      N         

    

 

             MONOCYTE # (test code = MO#) 0.57 K/mm3   0-0.8        N           

  

 

             EOSINOPHIL # (test code = EO#) 0.28 K/mm3   0.0-0.5      N         

    

 

             BASOPHIL # (test code = BA#) 0.02 K/mm3   0.0-0.2      N           

  

 

             NUCLEATED RBC # (test code = NRBC#) 0.00 K/mm3   0.0-0.1      N    

         





PT WENT TO [facility] PER MIRIAM SANFORD([id number]) @V.LAB. 0848- HEPA IMAG 
INCL GB W [id number] 09:16:00 FAX: Patricia Abrams MD   [phone] 
  [place]: B   St: ADM FAX: Eulogio Smith MD      [phone]   
------------------------------------------------------------------------------- 
Name:   CHARLEE OSUNA              [facility]                     :
1965  Age/S: 54/F           [address] [facility]                Unit #: 
[id number]      Loc: [address]       [place]  [address]              Phys: 
Eulogio Carrillo MD                                                     Acct: 
[id number] Dis Date:               Status: ADM IN                             
   PHONE #: [phone]     Exam Date:     2019                 
 FAX #: [phone]     Reason: post cholecystectomy pain possible bile leak   
   EXAMS:                                               CPT CODE:      [id number]
HEPA IMAG INCL GB W PHA                    [id number]                    HISTORY: 
post cholecystectomy pain possible bile leak               EXAM:  NUCLEAR 
MEDICINE HEPATOBILIARY SCAN.                TECHNIQUE: After IV injection of 5.5
mCi Tc 99m Choletec, sequential       planar images were obtained over the upper
abdomen out to 60 minutes.               FINDINGS:  Homogeneous distribution of 
radiopharmaceutical in the       liver. Gallbladder is surgically absent.. 
Normal accumulation of       radiopharmaceutical in small bowel by 15 minutes. 
No tracer uptake       into the gallbladder fossa.                 IMPRESSION:  
                  No evidence of biliary obstruction and no evidence of bile 
leak into         the peritoneal cavity.          ** Electronically Signed by 
Stephen Jauregui MD on 2019 at 0916 **                      Reported and signed
by: Stephen Jauregui MD                       CC: Patricia Peña MD; Eulogio Carrillo MD
                                                                                
                   Technologist: Jenny Haley RT(N)                            
       Trnscrd Date/Time/By: 2019 (0916) : By: [email or web address]          Orig 
Print D/T: S: 2019 (20)                         PAGE  1                 
     Signed Report                               [id number] 05:08:00* 



             Test Item    Value        Reference Range Interpretation Comments

 

             GLUBED (test code = GLUBED) 127 mg/dL           H            

Performed by certified  

at [facility]





[id number] 20:17:00* 



             Test Item    Value        Reference Range Interpretation Comments

 

             GLUBED (test code = GLUBED) 161 mg/dL           H            

Performed by certified  

at [facility]





[id number] 16:47:00* 



             Test Item    Value        Reference Range Interpretation Comments

 

             GLUBED (test code = GLUBED) 150 mg/dL           H            

Performed by certified  

at [facility]





[id number] 13:05:00* 



             Test Item    Value        Reference Range Interpretation Comments

 

             GLUBED (test code = GLUBED) 153 mg/dL           H            

Performed by certified  

at [facility]





COMPREHENSIVE METABOLIC [id number] 10:43:00* 



             Test Item    Value        Reference Range Interpretation Comments

 

             SODIUM (test code = NA) 143 mmol/L   136-145                   RESU

LT VERIFIED BY REPEAT ANALYSIS

 

             POTASSIUM (test code = K) 3.9 mmol/L   3.5-5.1      N             

 

             CHLORIDE (test code = CL) 109.0 mmol/L        H             

 

             CARBON DIOXIDE (test code = CO2) 22.0 mmol/L  21-32        N       

      

 

             ANION GAP (test code = GAP) 15.9         10-20        N            

 

 

             GLUCOSE (test code = GLU) 148 mg/dL           H             

 

             BLOOD UREA NITROGEN (test code = BUN) 11 mg/dL     7-18         N  

           

 

             GLOMERULAR FILTRATION RATE (test code = GFR) > 60 mL/min  >=60     

                 Estimated GFR by

using Modified MDRD formula.Chronic kidney disease is defined as either kidney 
damageor GFR <60 mL/min/1.73 m2 for >3 months.

 

             CREATININE (test code = CREAT) 0.80 mg/dL   0.55-1.02    N         

   **Note change in reference

range due to change in reagent.**

 

             BUN/CREATININE RATIO (test code = BUN/CREA) 13.8         10-20     

   N             

 

             TOTAL PROTEIN (test code = PROT) 6.4 gram/dL  6.4-8.2      N       

      

 

             ALBUMIN (test code = ALB) 2.2 g/dL     3.4-5.0      L             

 

             GLOBULIN (test code = GLOB) 4.2 gram/dL  2.7-4.2      N            

 

 

             ALBUMIN/GLOBULIN RATIO (test code = A/G) 0.5          0.75-1.50    

L             

 

             CALCIUM (test code = CA) 8.5 mg/dL    8.5-10.1     N             

 

             BILIRUBIN TOTAL (test code = BILT) 0.60 mg/dL   0.0-1.0      N     

        

 

             SGOT/AST (test code = AST) 39 IUnit/L   15-37        H             

 

             SGPT/ALT (test code = ALT) 31 IUnit/L   12-78        N             

 

             ALKALINE PHOSPHATASE TOTAL (test code = ALKP) 94 IUnit/L     

     N            **Note change 

in reference range due to change in reagent.**





[id number] 10:43:00* 



             Test Item    Value        Reference Range Interpretation Comments

 

             MAGNESIUM (test code = MAG) 1.6 mg/dL    1.8-2.4      L            

 





CBC W/AUTO [id number] 10:21:00* 



             Test Item    Value        Reference Range Interpretation Comments

 

             WHITE BLOOD CELL (test code = WBC) 18.4 K/mm3   4.5-12.5     H     

        

 

             RED BLOOD CELL (test code = RBC) 4.90 mill/mm3 3.7-5.2      N      

       

 

             HEMOGLOBIN (test code = HGB) 14.3 gram/dL 11.5-15.5    N           

  

 

             HEMATOCRIT (test code = HCT) 43.7 %       36.0-46.0    N           

  

 

             MEAN CELL VOLUME (test code = MCV) 89.2 fL      80-98        N     

        

 

             MEAN CELL HGB (test code = MCH) 29.2 picogram 27.0-33.0    N       

      

 

             MEAN CELL HGB CONCETRATION (test code = MCHC) 32.7 gram/dL 33.0-36.

0    L             

 

             RED CELL DISTRIBUTION WIDTH (test code = RDW) 12.7 %       11.6-16.

2    N             

 

             RED CELL DISTRIBUTION WIDTH SD (test code = RDW-SD) 41.7 fL      37

.0-51.0    N             

 

             PLATELET COUNT (test code = PLT) 214 K/mm3    150-450              

     RESULT VERIFIED BY REPEAT 

ANALYSIS

 

             MEAN PLATELET VOLUME (test code = MPV) 11.2 fL      6.7-11.0     H 

            

 

             NEUTROPHIL % (test code = NT%) 84.5 %       39.0-69.0    H         

    

 

             IMMATURE GRANULOCYTE % (test code = IG%) 0.7 %        0.0-5.0      

N             

 

             LYMPHOCYTE % (test code = LY%) 9.1 %        25.0-55.0    L         

    

 

             MONOCYTE % (test code = MO%) 4.0 %        0.0-10.0     N           

  

 

             EOSINOPHIL % (test code = EO%) 1.4 %        0.0-5.0      N         

    

 

             BASOPHIL % (test code = BA%) 0.3 %        0.0-1.0      N           

  

 

             NUCLEATED RBC % (test code = NRBC%) 0.0 %        0-0          N    

         

 

             NEUTROPHIL # (test code = NT#) 15.60 K/mm3  1.8-7.7      H         

    

 

             IMMATURE GRANULOCYTE # (test code = IG#) 0.12 x10 3/uL 0-0.03      

 H             

 

             LYMPHOCYTE # (test code = LY#) 1.67 K/mm3   1.0-5.0      N         

    

 

             MONOCYTE # (test code = MO#) 0.73 K/mm3   0-0.8        N           

  

 

             EOSINOPHIL # (test code = EO#) 0.26 K/mm3   0.0-0.5      N         

    

 

             BASOPHIL # (test code = BA#) 0.05 K/mm3   0.0-0.2      N           

  

 

             NUCLEATED RBC # (test code = NRBC#) 0.00 K/mm3   0.0-0.1      N    

         





[id number] 05:37:00* 



             Test Item    Value        Reference Range Interpretation Comments

 

             GLUBED (test code = GLUBED) 163 mg/dL           H            

Performed by certified  

at [facility]





LACTIC [id number] 23:47:00* 



             Test Item    Value        Reference Range Interpretation Comments

 

             LACTIC ACID (test code = LACT) 2.3 mmol/L   0.4-1.9      HH        

   Results called to [id number] 

by V.LAB.ROB 19 2347Critical results verified and read back by Nurse? Y





[id number] 21:23:00* 



             Test Item    Value        Reference Range Interpretation Comments

 

             GLUBED (test code = GLUBED) 202 mg/dL           H            

Performed by certified  

at [facility]





LACTIC [id number] 20:58:00* 



             Test Item    Value        Reference Range Interpretation Comments

 

             LACTIC ACID (test code = LACT) 2.3 mmol/L   0.4-1.9      HH        

   Results called to [id number] 

by V.LAB.LT 19Critical results verified and read back by Nurse? Y





LACTIC [id number] 17:29:00* 



             Test Item    Value        Reference Range Interpretation Comments

 

             LACTIC ACID (test code = LACT) 2.7 mmol/L   0.4-1.9      HH        

   Results called to TAS3495 

by V.LAB.KP1 19 1729Critical results verified and read back by Nurse? Y





- CT ABD PELVIS W/[id number] 16:48:00  Name: CHARLEE OSUNA            
  [facility]                     : 1965 Age/S: 54  / F         [address]                Unit #: [id number]     Loc:               [place]  [address]              Phys: Nehemias Day MD                              
                Acct: [id number]  Dis Date:               Status: REG ER      
                           PHONE #: [phone]     Exam Date: 2019  1638
                    FAX #: [phone]      Reason: ABD PAIN, VOMITING         
                        EXAMS:                                               CPT
CODE:      [id number] CT ABD PELVIS W/CONT                       [id number]           
                REASON FOR EXAM: ABD PAIN, VOMITING               EXAM ORDER 
DATE: 2019 2:47 PM               Ordering MRUTHANN: Nehemias Day MD      
        PROCEDURE:  - CT ABD PELVIS W/CONT               COMPARISON: 2019   
           FINDINGS: CT images of the abdomen and pelvis were obtained with IV  
    and without oral contrast at 5mm. Dose modulation, iterative       
reconstruction, and/or weight based adjustment of the MA/KV was       utilized 
to reduce the radiation dose to as low as reasonably       achievable.          
     Intravenous contrast: 100cc of Omnipaque 370.               The spleen, 
pancreas are grossly within normal limits. The liver is       diffusely 
hypodense.       The patient is status post cholecystectomy               The 
kidneys are within normal limits.  The urinary bladder is       contracted      
        The colon, small bowel, and stomach are within normal limits without    
  evidence of obstruction.  The appendix was not seen               No evidence 
of free air .  The uterus is unremarkable.                 IMPRESSION: Diffuse 
fatty infiltration of the liver. Minimal         nonspecific free fluid in the 
cul-de-sac. Previously seen right         surgical drain has been removed.      
   ** Electronically Signed by VINCENT Zavala on 2019 at 9476 **            
         Reported and signed by: Italo Zavala M.D.     CC: Nehemias Day MD    
                                                                                
                             Technologist:Susy Marin RT(R); Abby  CTDI: 
      DLP:        Trnscb Date/Time: 2019 (5851) tKAINL                 
      Orig Print D/T: S: 2019 (6840)      PAGE  1                       
Signed Report                               PROCALCITONIN (PCT)2019 
16:27:00* 



             Test Item    Value        Reference Range Interpretation Comments

 

             PROCALCITONIN (PCT) (test code = PROCAL) 1.45 ng/ml                

             Concentration   

Interpretation   (ng/mL)      -------------   
--------------------------------------------<0.51           Sepsis is not 
likely.                Local bacterial infection is possible.                
(LOW RISK for progression to Sepsis) 0.51 - 2.00     Sepsis is possible, but 
other conditions                are known to elevate PCT as well.               
(MODERATE RISK for progression to Sepsis) > 2.00          Sepsis is likely, 
unless other causes are                known.                (HIGH RISK for 
progression to Severe Sepsis                or Septic Shock) 10.00           
High likelihood of Severe Sepsis or Septic  or higher     Shock.                
*Increased PCT levels may not always be related to systemic bacterial 
infection.*Low PCT levels do not automatically exclude the presence of bacterial
infection.*All results should be interpreted taking into account the  patients 
history.





LACTIC [id number] 15:33:00* 



             Test Item    Value        Reference Range Interpretation Comments

 

             LACTIC ACID (test code = LACT) 2.7 mmol/L   0.4-1.9      HH        

   Results called to DR Thais RIDERLAB.KP1 19 1533Critical results verified and read back by 
Nurse? Y





BASIC METABOLIC [id number] 15:33:00* 



             Test Item    Value        Reference Range Interpretation Comments

 

             SODIUM (test code = NA) 135 mmol/L   136-145      L             

 

             POTASSIUM (test code = K) 3.8 mmol/L   3.5-5.1      N             

 

             CHLORIDE (test code = CL) 99.0 mmol/L         N             

 

             CARBON DIOXIDE (test code = CO2) 27.0 mmol/L  21-32        N       

      

 

             ANION GAP (test code = GAP) 12.8         10-20        N            

 

 

             GLUCOSE (test code = GLU) 358 mg/dL           H             

 

             BLOOD UREA NITROGEN (test code = BUN) 11 mg/dL     7-18         N  

           

 

             GLOMERULAR FILTRATION RATE (test code = GFR) 46 mL/min    >=60     

                 Estimated GFR by 

using Modified MDRD formula.Chronic kidney disease is defined as either kidney 
damageor GFR <60 mL/min/1.73 m2 for >3 months.

 

             CREATININE (test code = CREAT) 1.20 mg/dL   0.55-1.02    H         

   **Note change in reference

range due to change in reagent.**

 

             BUN/CREATININE RATIO (test code = BUN/CREA) 9.6          10-20     

   L             

 

             CALCIUM (test code = CA) 9.7 mg/dL    8.5-10.1     N             





[id number] 15:33:00* 



             Test Item    Value        Reference Range Interpretation Comments

 

             TROPONIN-I (test code = TROPI) <0.015 ng/mL 0-0.045      N         

    





BASIC METABOLIC [id number] 15:25:00* 



             Test Item    Value        Reference Range Interpretation Comments

 

             SODIUM (test code = NA) 135 mmol/L   136-145      L             

 

             POTASSIUM (test code = K) 3.8 mmol/L   3.5-5.1      N             

 

             CHLORIDE (test code = CL) 99.0 mmol/L         N             

 

             CARBON DIOXIDE (test code = CO2)  mmol/L      21-32                

      

 

             ANION GAP (test code = GAP)              10-20                     

 

 

             GLUCOSE (test code = GLU)  mg/dL                            

 

             BLOOD UREA NITROGEN (test code = BUN)  mg/dL       7-18            

           

 

             GLOMERULAR FILTRATION RATE (test code = GFR)  mL/min      >=60     

                  

 

             CREATININE (test code = CREAT)  mg/dL       0.55-1.02              

    

 

             BUN/CREATININE RATIO (test code = BUN/CREA)              10-20     

                 

 

             CALCIUM (test code = CA)  mg/dL       8.5-10.1                   





[id number] 15:25:00* 



             Test Item    Value        Reference Range Interpretation Comments

 

             TROPONIN-I (test code = TROPI)  ng/mL       0-0.045                

    





- XR CHEST 1 -30-72 15:23:00 FAX: Nehemias Constantino [phone]
   [place]: B   St: REG----------
---------------------------------------------------------------------  Name:   CHARLEE LUO              [facility]                     : [birth year]  Age/S: 54/F           [address]                Unit #: [id number]    
 Loc: [place]  [address]              Phys: Nehemias Day MD
                                              Acct: [id number] Dis Date:      
        Status: REG ER                                 PHONE #: [phone]    
Exam Date:     2019     1516                   FAX #: [phone]     
Reason: COUGH                                              EXAMS:               
                               CPT CODE:      [id number] XR CHEST 1 V            
                  [address]                            REASON FOR EXAM: COUGH       
       EXAM ORDER DATE: 2019 1:59 PM               Ordering MRUTHANN: Nehemias Day MD                PROCEDURE:  - XR CHEST 1 V               COMPARI
SON: 9/3/2019               FINDINGS:  Portable AP frontal view of the chest obt
ained at 3:16 PM       shows clear lungs without evidence of consolidation. Ther
e is no       evidence of effusion. The heart size is within normal limits.     
 Pulmonary vasculatures are unremarkable.                  IMPRESSION: Hypoaera
mony lungs with atelectasis of the left base          ** Electronically Signed by
VINCENT Zavala on 2019 at 1523 **                      Reported and signed 
by: Italo Zavala M.D.                       CC: Nehemias Day MD               
                                                                                
                  Technologist: RT Hay(R)                          
      Trnscrd Date/Time/By: 2019 (1523) : By: Elias           Orig 
Print D/T: S: 2019 (9382)                         PAGE  1                 
     Signed Report                               CBC W/O [id number] 15:12:00
  * 



             Test Item    Value        Reference Range Interpretation Comments

 

             WHITE BLOOD CELL (test code = WBC) 22.9 K/mm3   4.5-12.5     H     

        

 

             RED BLOOD CELL (test code = RBC) 5.27 mill/mm3 3.7-5.2      H      

       

 

             HEMOGLOBIN (test code = HGB) 15.5 gram/dL 11.5-15.5    N           

  

 

             HEMATOCRIT (test code = HCT) 46.9 %       36.0-46.0    H           

  

 

             MEAN CELL VOLUME (test code = MCV) 89.0 fL      80-98        N     

        

 

             MEAN CELL HGB (test code = MCH) 29.4 picogram 27.0-33.0    N       

      

 

             MEAN CELL HGB CONCETRATION (test code = MCHC) 33.0 gram/dL 33.0-36.

0    N             

 

             RED CELL DISTRIBUTION WIDTH (test code = RDW) 12.8 %       11.6-16.

2    N             

 

             PLATELET COUNT (test code = PLT) 307 K/mm3    150-450      N       

      

 

             MEAN PLATELET VOLUME (test code = MPV) 10.7 fL      6.7-11.0     N 

            





CBC W/O [id number] 15:09:00* 



             Test Item    Value        Reference Range Interpretation Comments

 

             WHITE BLOOD CELL (test code = WBC)  K/mm3       4.5-12.5           

        

 

             RED BLOOD CELL (test code = RBC)  mill/mm3    3.7-5.2              

      

 

             HEMOGLOBIN (test code = HGB) 15.5 gram/dL 11.5-15.5    N           

  

 

             HEMATOCRIT (test code = HCT)  %           36.0-46.0                

  

 

             MEAN CELL VOLUME (test code = MCV)  fL          80-98              

        

 

             MEAN CELL HGB (test code = MCH)  picogram    27.0-33.0             

     

 

             MEAN CELL HGB CONCETRATION (test code = MCHC)  gram/dL     33.0-36.

0                  

 

             RED CELL DISTRIBUTION WIDTH (test code = RDW)  %           11.6-16.

2                  

 

             PLATELET COUNT (test code = PLT)  K/mm3       150-450              

      

 

             MEAN PLATELET VOLUME (test code = MPV)  fL          6.7-11.0       

            





URINALYSIS [id number] 14:48:00* 



             Test Item    Value        Reference Range Interpretation Comments

 

             UA COLOR (test code = COLU) YELLOW       YELLOW                    

 

 

             UA APPEARANCE (test code = APPU) CLOUDY       CLEAR        A       

      

 

             UA GLUCOSE DIPSTICK (test code = DGLUU) 300-500 (3+) mg/dL NEGATIVE

                   

 

             UA BILIRUBIN DIPSTICK (test code = BILU) NEGATIVE     NEGATIVE     

              

 

             UA KETONE DIPSTICK (test code = KETU) NEGATIVE mg/dL NEGATIVE      

             

 

             UA SPECIFIC GRAVITY (test code = SGU) 1.010        1.001-1.035     

           

 

             UA BLOOD DIPSTICK (test code = JEN) TRACE        NEGATIVE          

         

 

             UA PH DIPSTICK (test code = BEN) 7.0          5.0-8.0              

      

 

             UA PROTEIN DIPSTICK (test code = PROU) TRACE (15) mg/dL Neg-15     

                

 

             UA UROBILINIOGEN DIPSTICK (test code = URO) 1 mg/dL  (1+) mg/dL 0.0

-0.2                    

 

             UA NITRITE DIPSTICK (test code = FOZIA) NEGATIVE     NEGATIVE       

            

 

             UA LEUKOCYTE ESTERASE W REFLEX (test code = LEUUR) 2+           NEG

ATIVE     A             

 

             UA WBC (test code = WBCU)  per HPF 0-5          A            

 

 

             UA RBC (test code = RBCU) 0-2 #/HPF    0-5                        

 

             UA WBC CLUMPS (test code = WBCUCL) 3-6 /HPF     NONE         A     

        

 

             UA EPITHELIAL CELLS (test code = EPIU) MANY per HPF FEW            

            

 

             UA BACTERIA (test code = BACU) FEW #/HPF    NONE         A         

    

 

             UA MUCUS (test code = MUCU) FEW #/LPF    FEW                       

 





Urine Source? Clean CatchURINALYSIS [id number] 14:42:00* 



             Test Item    Value        Reference Range Interpretation Comments

 

             UA COLOR (test code = COLU) YELLOW       YELLOW                    

 

 

             UA APPEARANCE (test code = APPU) CLOUDY       CLEAR        A       

      

 

             UA GLUCOSE DIPSTICK (test code = DGLUU) 300-500 (3+) mg/dL NEGATIVE

                   

 

             UA BILIRUBIN DIPSTICK (test code = BILU) NEGATIVE     NEGATIVE     

              

 

             UA KETONE DIPSTICK (test code = KETU) NEGATIVE mg/dL NEGATIVE      

             

 

             UA SPECIFIC GRAVITY (test code = SGU) 1.010        1.001-1.035     

           

 

             UA BLOOD DIPSTICK (test code = JEN) TRACE        NEGATIVE          

         

 

             UA PH DIPSTICK (test code = BEN) 7.0          5.0-8.0              

      

 

             UA PROTEIN DIPSTICK (test code = PROU) TRACE (15) mg/dL Neg-15     

                

 

             UA UROBILINIOGEN DIPSTICK (test code = URO) 1 mg/dL  (1+) mg/dL 0.0

-0.2                    

 

             UA NITRITE DIPSTICK (test code = FOZIA) NEGATIVE     NEGATIVE       

            

 

             UA LEUKOCYTE ESTERASE W REFLEX (test code = LEUUR) 2+           NEG

ATIVE     A             

 

             UA WBC (test code = WBCU)  per HPF     0-5                        

 

             UA RBC (test code = RBCU)  per HPF     0-5                        

 

             UA EPITHELIAL CELLS (test code = EPIU)  per HPF     Few            

            

 

             UA BACTERIA (test code = BACU)  per HPF     NONE                   

    





Urine Source? Clean [id number] 11:17:00
--------------------------------------------------------------------------------
------------RUN DATE: 19                         [facility]           
              PAGE 1   RUN TIME: 1117                            Specimen Inqui
ry                    RUN USER: INTERFACE                                       
                   ------------------------------------------------------------
--------------------------------PATIENT: CHARLEE OSUNA           ACCT #: [id number]
[id number] LOC:  CALEB    U #: [id number]                                    
  AGE/SX: 54/F         [facility]     RE19REG DR:  Jesus Singer MD          :    61     BED:  A          DIS: 19                 
                     STATUS: DIS IN       TLOC:           --------------------
------------------------------------------------------------------------ SPEC #:
[id number]     RECD:      STATUS:  DUANE BROCK #: [id number]
[id number]                           [place]: 19-     [facility] DR: Eulogio Carrillo MD  
             ENTERED:      SP TYPE: GALLBLROBB CARTER DR: Michael Foley MD, Daniel Haryanto MD Lam, David N MDORDERED:  GROSS                                         
                                    COPIES TO:   Michael Foley MD   [address]   [place], [place] [address] [phone]    Franko Joel MD 
 [address]   [place], [place] [id number] [phone]    Eulogio Carrillo MD   [address]   [place] [address]   [phone] MARKERS: ABNORMAL TISSUE, GA
LLBLADDER PROCEDURES: GROSS () TISSUES:           GALLBLADDER, NOS 
       CLINICAL HISTORY    COLLECTION DATE: 19         FINAL DIAGNOSIS    G
allbladder, cholecystectomy:        CHRONIC CHOLECYSTITIS WITH MILD MURAL FIBROS
IS        CHOLELITHIASIS        SMALL FRAGMENT OF ATTACHED HEPATIC TISSUE WITH M
ARKED DIFFUSE          MACROVESICULAR STEATOSIS        FEW UNREMARKABLE BILE AIDAN
TS IN AREA OF HEPATIC TISSUE        NEGATIVE FOR MALIGNANCY           RRB/sm    
                           ** CONTINUED ON NEXT PAGE ** -----------------------
---------------------------------------------------------------------RUN DATE: 0
19                         [facility]                          PAGE 2  
RUN TIME: 1117                            Specimen Inquiry                    R
UN USER: INTERFACE                                                           ---
--------------------------------------------------------------------------------
---------SPEC #: [id number]    PATIENT: BISI PRADOCHARLEE            #[id number]
[id number]  (Continued)-----------------------------------------------------------
---------------------------------         FINAL DIAGNOSIS           (Continued) 
  A   [id number]           MACROSCOPIC    The specimen is received in formalin, allison
led with the patient's name and   identified as "gallbladder".  It consists of a
gallbladder which is received in   two portions and measures 6.2 x 4.4 x 1.7 cm 
on reconstruction.  Also received   in the same container are two gallstones wh
ich are black-dark green measuring   4.7 and 0.9 cm.  The serosal surface of the
gallbladder is unremarkable.  The   mucosal surface is smooth.  The gallbladder 
wall measures 0.2 cm in thickness.    Samples of the specimen are submitted for 
microscopic evaluation in a single   cassette.       GROSS PERFORMED AT [facility]   [address] [facility] [place] [address] [phone]           MICROSCOPIC    All of the sta
ins, including any controls performed, stain appropriately.       MICROSCOPIC PE
RFORMED AT [facility]   [address] [facility] [place] [address] [phone]         PERFORMING SITE    Diagno
sis performed at:        [facility]        [address] [facility] [place] [address] [phone]-------------------------------------------------------------------
------------------------- Signed SIGNATURE ON FILE                        Nico Leigh MD 19 1117  ---------------------------------------------------
-----------------------------------------                                       
 ** END OF REPORT ** [id number] 12:58:00* 



             Test Item    Value        Reference Range Interpretation Comments

 

             GLUBED (test code = GLUBED) 163 mg/dL           H            

Performed by certified  

at [facility]





[id number] 17:48:00* 



             Test Item    Value        Reference Range Interpretation Comments

 

             GLUBED (test code = GLUBED) 151 mg/dL           H            

Performed by certified  

at [facility]





[id number] 07:35:00* 



             Test Item    Value        Reference Range Interpretation Comments

 

             GLUBED (test code = GLUBED) 150 mg/dL           H            

Performed by certified  

at [facility]





BASIC METABOLIC [id number] 06:45:00* 



             Test Item    Value        Reference Range Interpretation Comments

 

             SODIUM (test code = NA) 142 mmol/L   136-145      N             

 

             POTASSIUM (test code = K) 4.3 mmol/L   3.5-5.1      N             

 

             CHLORIDE (test code = CL) 110.0 mmol/L        H             

 

             CARBON DIOXIDE (test code = CO2) 21.0 mmol/L  21-32        N       

      

 

             ANION GAP (test code = GAP) 15.3         10-20        N            

 

 

             GLUCOSE (test code = GLU) 160 mg/dL           H             

 

             BLOOD UREA NITROGEN (test code = BUN) 10 mg/dL     7-18         N  

           

 

             GLOMERULAR FILTRATION RATE (test code = GFR) > 60 mL/min  >=60     

                 Estimated GFR by

using Modified MDRD formula.Chronic kidney disease is defined as either kidney 
damageor GFR <60 mL/min/1.73 m2 for >3 months.

 

             CREATININE (test code = CREAT) 0.50 mg/dL   0.55-1.02    L         

   **Note change in reference

range due to change in reagent.**

 

             BUN/CREATININE RATIO (test code = BUN/CREA) 18.6         10-20     

   N             

 

             CALCIUM (test code = CA) 8.8 mg/dL    8.5-10.1     N             





BASIC METABOLIC [id number] 06:38:00* 



             Test Item    Value        Reference Range Interpretation Comments

 

             SODIUM (test code = NA) 142 mmol/L   136-145      N             

 

             POTASSIUM (test code = K) 4.3 mmol/L   3.5-5.1      N             

 

             CHLORIDE (test code = CL) 110.0 mmol/L        H             

 

             CARBON DIOXIDE (test code = CO2)  mmol/L      21-32                

      

 

             ANION GAP (test code = GAP)              10-20                     

 

 

             GLUCOSE (test code = GLU)  mg/dL                            

 

             BLOOD UREA NITROGEN (test code = BUN)  mg/dL       7-18            

           

 

             GLOMERULAR FILTRATION RATE (test code = GFR)  mL/min      >=60     

                  

 

             CREATININE (test code = CREAT)  mg/dL       0.55-1.02              

    

 

             BUN/CREATININE RATIO (test code = BUN/CREA)              10-20     

                 

 

             CALCIUM (test code = CA)  mg/dL       8.5-10.1                   





[id number] 20:50:00* 



             Test Item    Value        Reference Range Interpretation Comments

 

             GLUBED (test code = GLUBED) 177 mg/dL           H            

Performed by certified  

at [facility]





GLUBED2019-09-10 17:19:00* 



             Test Item    Value        Reference Range Interpretation Comments

 

             GLUBED (test code = GLUBED) 211 mg/dL           H            

Performed by certified  

at [facility]





GLUBED2019-09-10 12:24:00* 



             Test Item    Value        Reference Range Interpretation Comments

 

             GLUBED (test code = GLUBED) 104 mg/dL           N            

Performed by certified  

at [facility]





GLUBED2019-09-10 07:09:00* 



             Test Item    Value        Reference Range Interpretation Comments

 

             GLUBED (test code = GLUBED) 172 mg/dL           H            

Performed by certified  

at [facility]





COMPREHENSIVE METABOLIC PANEL2019-09-10 07:08:00* 



             Test Item    Value        Reference Range Interpretation Comments

 

             SODIUM (test code = NA) 141 mmol/L   136-145      N             

 

             POTASSIUM (test code = K) 3.7 mmol/L   3.5-5.1      N             

 

             CHLORIDE (test code = CL) 108.0 mmol/L        H             

 

             CARBON DIOXIDE (test code = CO2) 25.0 mmol/L  21-32        N       

      

 

             ANION GAP (test code = GAP) 11.7         10-20        N            

 

 

             GLUCOSE (test code = GLU) 183 mg/dL           H             

 

             BLOOD UREA NITROGEN (test code = BUN) 10 mg/dL     7-18         N  

           

 

             GLOMERULAR FILTRATION RATE (test code = GFR) > 60 mL/min  >=60     

                 Estimated GFR by

using Modified MDRD formula.Chronic kidney disease is defined as either kidney 
damageor GFR <60 mL/min/1.73 m2 for >3 months.

 

             CREATININE (test code = CREAT) 0.60 mg/dL   0.55-1.02    N         

   **Note change in reference

range due to change in reagent.**

 

             BUN/CREATININE RATIO (test code = BUN/CREA) 16.2         10-20     

   N             

 

             TOTAL PROTEIN (test code = PROT) 6.4 gram/dL  6.4-8.2      N       

      

 

             ALBUMIN (test code = ALB) 2.4 g/dL     3.4-5.0      L             

 

             GLOBULIN (test code = GLOB) 4.0 gram/dL  2.7-4.2      N            

 

 

             ALBUMIN/GLOBULIN RATIO (test code = A/G) 0.6          0.75-1.50    

L             

 

             CALCIUM (test code = CA) 8.9 mg/dL    8.5-10.1     N             

 

             BILIRUBIN TOTAL (test code = BILT) 0.60 mg/dL   0.0-1.0      N     

        

 

             SGOT/AST (test code = AST) 39 IUnit/L   15-37        H             

 

             SGPT/ALT (test code = ALT) 57 IUnit/L   12-78        N             

 

             ALKALINE PHOSPHATASE TOTAL (test code = ALKP) 94 IUnit/L     

     N            **Note change 

in reference range due to change in reagent.**





COMPREHENSIVE METABOLIC PANEL2019-09-10 06:59:00* 



             Test Item    Value        Reference Range Interpretation Comments

 

             SODIUM (test code = NA) 141 mmol/L   136-145      N             

 

             POTASSIUM (test code = K) 3.7 mmol/L   3.5-5.1      N             

 

             CHLORIDE (test code = CL) 108.0 mmol/L        H             

 

             CARBON DIOXIDE (test code = CO2)  mmol/L      21-32                

      

 

             ANION GAP (test code = GAP)              10-20                     

 

 

             GLUCOSE (test code = GLU)  mg/dL                            

 

             BLOOD UREA NITROGEN (test code = BUN)  mg/dL       7-18            

           

 

             GLOMERULAR FILTRATION RATE (test code = GFR)  mL/min      >=60     

                  

 

             CREATININE (test code = CREAT)  mg/dL       0.55-1.02              

    

 

             BUN/CREATININE RATIO (test code = BUN/CREA)              10-20     

                 

 

             TOTAL PROTEIN (test code = PROT)  gram/dL     6.4-8.2              

      

 

             ALBUMIN (test code = ALB)  g/dL        3.4-5.0                    

 

             GLOBULIN (test code = GLOB)  gram/dL     2.7-4.2                   

 

 

             ALBUMIN/GLOBULIN RATIO (test code = A/G)              0.75-1.50    

              

 

             CALCIUM (test code = CA)  mg/dL       8.5-10.1                   

 

             BILIRUBIN TOTAL (test code = BILT)  mg/dL       0.0-1.0            

        

 

             SGOT/AST (test code = AST)  IUnit/L     15-37                      

 

             SGPT/ALT (test code = ALT)  IUnit/L     12-78                      

 

             ALKALINE PHOSPHATASE TOTAL (test code = ALKP)  IUnit/L       

                   





CBC W/AUTO DIFF2019-09-10 06:24:00* 



             Test Item    Value        Reference Range Interpretation Comments

 

             WHITE BLOOD CELL (test code = WBC) 8.2 K/mm3    4.5-12.5     N     

        

 

             RED BLOOD CELL (test code = RBC) 4.60 mill/mm3 3.7-5.2      N      

       

 

             HEMOGLOBIN (test code = HGB) 13.4 gram/dL 11.5-15.5    N           

  

 

             HEMATOCRIT (test code = HCT) 41.8 %       36.0-46.0    N           

  

 

             MEAN CELL VOLUME (test code = MCV) 90.9 fL      80-98        N     

        

 

             MEAN CELL HGB (test code = MCH) 29.1 picogram 27.0-33.0    N       

      

 

             MEAN CELL HGB CONCETRATION (test code = MCHC) 32.1 gram/dL 33.0-36.

0    L             

 

             RED CELL DISTRIBUTION WIDTH (test code = RDW) 13.0 %       11.6-16.

2    N             

 

             RED CELL DISTRIBUTION WIDTH SD (test code = RDW-SD) 43.5 fL      37

.0-51.0    N             

 

             PLATELET COUNT (test code = PLT) 216 K/mm3    150-450      N       

      

 

             MEAN PLATELET VOLUME (test code = MPV) 10.6 fL      6.7-11.0     N 

            

 

             NEUTROPHIL % (test code = NT%) 59.3 %       39.0-69.0    N         

    

 

             IMMATURE GRANULOCYTE % (test code = IG%) 0.5 %        0.0-5.0      

N             

 

             LYMPHOCYTE % (test code = LY%) 31.4 %       25.0-55.0    N         

    

 

             MONOCYTE % (test code = MO%) 6.3 %        0.0-10.0     N           

  

 

             EOSINOPHIL % (test code = EO%) 2.1 %        0.0-5.0      N         

    

 

             BASOPHIL % (test code = BA%) 0.4 %        0.0-1.0      N           

  

 

             NUCLEATED RBC % (test code = NRBC%) 0.0 %        0-0          N    

         

 

             NEUTROPHIL # (test code = NT#) 4.89 K/mm3   1.8-7.7      N         

    

 

             IMMATURE GRANULOCYTE # (test code = IG#) 0.04 x10 3/uL 0-0.03      

 H             

 

             LYMPHOCYTE # (test code = LY#) 2.59 K/mm3   1.0-5.0      N         

    

 

             MONOCYTE # (test code = MO#) 0.52 K/mm3   0-0.8        N           

  

 

             EOSINOPHIL # (test code = EO#) 0.17 K/mm3   0.0-0.5      N         

    

 

             BASOPHIL # (test code = BA#) 0.03 K/mm3   0.0-0.2      N           

  

 

             NUCLEATED RBC # (test code = NRBC#) 0.00 K/mm3   0.0-0.1      N    

         





CBC W/AUTO DIFF2019-09-10 06:22:00* 



             Test Item    Value        Reference Range Interpretation Comments

 

             WHITE BLOOD CELL (test code = WBC)  K/mm3       4.5-12.5           

        

 

             RED BLOOD CELL (test code = RBC)  mill/mm3    3.7-5.2              

      

 

             HEMOGLOBIN (test code = HGB) 13.4 gram/dL 11.5-15.5    N           

  

 

             HEMATOCRIT (test code = HCT) 41.8 %       36.0-46.0    N           

  

 

             MEAN CELL VOLUME (test code = MCV)  fL          80-98              

        

 

             MEAN CELL HGB (test code = MCH)  picogram    27.0-33.0             

     

 

             MEAN CELL HGB CONCETRATION (test code = MCHC)  gram/dL     33.0-36.

0                  

 

             RED CELL DISTRIBUTION WIDTH (test code = RDW)  %           11.6-16.

2                  

 

             RED CELL DISTRIBUTION WIDTH SD (test code = RDW-SD)  fL          37

.0-51.0                  

 

             PLATELET COUNT (test code = PLT)  K/mm3       150-450              

      

 

             MEAN PLATELET VOLUME (test code = MPV)  fL          6.7-11.0       

            

 

             NEUTROPHIL % (test code = NT%)  %           39.0-69.0              

    

 

             IMMATURE GRANULOCYTE % (test code = IG%)  %           0.0-5.0      

              

 

             LYMPHOCYTE % (test code = LY%)  %           25.0-55.0              

    

 

             MONOCYTE % (test code = MO%)  %           0.0-10.0                 

  

 

             EOSINOPHIL % (test code = EO%)  %           0.0-5.0                

    

 

             BASOPHIL % (test code = BA%)  %           0.0-1.0                  

  

 

             NEUTROPHIL # (test code = NT#)  K/mm3       1.8-7.7                

    

 

             LYMPHOCYTE # (test code = LY#)  K/mm3       1.0-5.0                

    

 

             MONOCYTE # (test code = MO#)  K/mm3       0-0.8                    

  

 

             EOSINOPHIL # (test code = EO#)  K/mm3       0.0-0.5                

    

 

             BASOPHIL # (test code = BA#)  K/mm3       0.0-0.2                  

  





- CT ABD PELVIS [id number] 23:07:00  Name: CHARLEE OSUNA [facility]                     : 1965 Age/S: 54  / F         [address]                Unit #: [id number]     Loc:               [place]  [address]              Phys: Nazanin Wilson                               
                Acct: [id number]  Dis Date:               Status: ADM IN      
                           PHONE #: [phone]     Exam Date: 2019  1825
                    FAX #: [phone]      Reason: RLQ abd pain               
                        EXAMS:                                               CPT
CODE:      [id number] CT ABD PELVIS W/CONT                       [id number]           
        REASON FOR EXAM: RLQ abd pain               EXAM ORDER DATE: 2019 
3:34 PM               Ordering MRUTHANN: PALOMA Horn               
PROCEDURE:  - CT ABD PELVIS W/CONT  contrast-enhanced axial CT images       were
acquired through the abdomen/pelvis at 5 mm intervals.  Sagittal       and 
coronal reformatted images were generated.  Automated exposure       control was
utilized for this reduction.               Phases of contrast: venous and 
delayed               COMPARISON: Abdominal ultrasound 2019        
      FINDINGS:                Visualized thorax: There is subsegmental 
atelectasis in the lung       bases.               Hepatobiliary system: Hepatic
steatosis. Gallbladder surgically       absent. Surgical drain passes by the 
gallbladder fossa and terminates       in the gastrohepatic space.              
Pancreas: Normal               Spleen: Normal               Adrenal glands: 
Normal               Genitourinary system: Simple cortical cyst in the upper 
pole of the       right kidney. Otherwise normal.               Gastrointestinal
tract and appendix: Prior appendectomy. Remainder of       the gastrointestinal 
tract is within normal limits.               Abdominal vascular structures: Mild
atherosclerotic disease in the       distal aorta               Peritoneum and 
retroperitoneum: No free air.  No omental or mesenteric       masses.  No 
abnormal lymph nodes. Trace low-density fluid in the       pelvis is a n
onspecific finding.               Musculoskeletal structures and abdominal wall:
There is disc       degeneration at L5-S1. Degenerative changes are present in 
the lower     PAGE  1                       Signed Report                    (CO
NTINUED)   Name: CHARLEE OSUNA [facility]               
     : 1965 Age/S: 54  / F         [address]                Unit
#: [id number]     Loc:               [place]  [address]              Phys: Nazanin Hale                                                Acct: [id number]
[id number]  Dis Date:               Status: ADM IN                                  P
MEI #: [phone]     Exam Date: 2019  1825                     FAX #: 
[phone]      Reason: RLQ abd pain                                        EX
AMS:                                               CPT CODE:      [id number] CT A
BD PELVIS W/CONT                       [address]               <Continued>        
thoracic spine.                 IMPRESSION:         No acute intra-abdominal 
process.         Prior cholecystectomy with surgical drain terminating in the 
right         upper abdomen         The gallbladder fossa. No abnormal fluid 
collections in the         gallbladder fossa.         Trace pelvic fluid is a no
nspecific finding.         Severe hepatic steatosis.          ** Electronically 
Signed by Stephen Jauregui MD on 2019 at 2307 **                      Reported 
and signed by: Stephen Jauregui MD                            CC: Patricia Peña MD;
Nazanin Wilson                                                            
                                   Technologist:Susy Marin RT(R)            
CTDI:        DLP:        Trnscb Date/Time: 2019 (2305) t.[id number]         
             Orig Print D/T: S: 2019 (1633)      PAGE  2                  
    Signed Report                               [id number] 20:38:00* 



             Test Item    Value        Reference Range Interpretation Comments

 

             GLUBED (test code = GLUBED) 280 mg/dL           H            

Performed by certified  

at [facility]





[id number] 16:30:00* 



             Test Item    Value        Reference Range Interpretation Comments

 

             GLUBED (test code = GLUBED) 183 mg/dL           H            

Performed by certified  

at [facility]





GASTRIC,[id number] 14:00:00
--------------------------------------------------------------------------------
------------RUN DATE: 19                         [facility]           
              PAGE 1   RUN TIME: 1401                            Specimen Inqui
ry                    RUN USER: INTERFACE                                       
                   ------------------------------------------------------------
--------------------------------PATIENT: CHARLEE OSUNA           ACCT #: [id number]
[id number] LOC:  [facility]    U #: [id number]                                    
  AGE/SX: 54/F         [facility]     RE[facility] DR:  Patricia Peña MD
           :    [birth year]     BED:  A          DIS:                           
                    STATUS: ADM IN       TLOC:           --------------------
------------------------------------------------------------------------ SPEC #:
[id number]     RECD:      STATUS:  DUANE           [facility] #: [id number]
[id number]                           [place]:      [facility] DR: Franko Joel MD    ENTERED:      SP TYPE: GASTRIC BX     OTHR DR: Michael Foley MD, Daniel Haryanto MDORDERED:  GROSS                                   
                                          COPIES TO:   Michael Foley MD   
[address]   [address]   [place], [place] [address] [phone]    Franko Joel MD   [address]   [place] [address]   [phone] PROCEDURES: GROSS (
) TISSUES:           1. GASTRIC CORPUS - NODULE         2. ESOPHAGU
S, NOS - BX         CLINICAL HISTORY    COLLECTION DATE:  2019       DYSPHAG
IA   POST-OP DIAGNOSIS:  REFLUX; GASTRITIS; HIATAL HERNIA         FINAL DIAGNOSI
S    Gastric nodule, biopsy:        CHRONIC ACTIVE GASTRITIS WITH REACTIVE EPITH
ELIAL CHANGE        POSITIVE FOR HELICOBACTER ORGANISMS        NEGATIVE FOR INTE
STINAL METAPLASIA        NEGATIVE FOR MALIGNANCY       Esophagus, biopsy:       
MIXED GLANDULAR AND SQUAMOUS EPITHELIUM WITH MILD ELONGATION OF           SQUAM
OUS PAPILLAE, BASAL CELL HYPERPLASIA AND FEW INTRAEPITHELIAL          LYMPHOCYTE
S        GASTRIC TYPE GLANDULAR MUCOSA WITH MILD TO MODERATE CHRONIC INFLAMMATIO
N        NEGATIVE FOR METAPLASIA, DYSPLASIA AND MALIGNANCY        COMPATIBLE WIT
H MILD REFLUX ESOPHAGITIS                                    ** CONTINUED ON NEX
T PAGE ** ----------------------------------------------------------------------
----------------------RUN DATE: 19                         [facility] 
                        PAGE 2   RUN TIME: 1401                            Spec
imen Inquiry                    RUN USER: INTERFACE                             
                             --------------------------------------------------
------------------------------------------SPEC #: [id number]    PATIENT: SHAHID MORALES CHARLEE PRADO            #[id number]  (Continued)--------------------------
------------------------------------------------------------------         FINAL
DIAGNOSIS           (Continued)        RRB/   D   [id number], 70264, 46147       
   MACROSCOPIC    Specimen (1) is received in formalin, labeled with the patie
nt's name,   identified as "gastric nodule", and consists of tan-pink biopsy tis
sylvia   measuring 0.3 cm in aggregate, submitted as (1) for H E and giemsa stains.
      Specimen (2) is received in formalin, labeled with the patient's name,   
identified as "esophagus", and consists of pink-tan biopsy tissue measuring    0
.25 cm, submitted as (2).       GROSS PERFORMED AT [facility]   [address] [place] [address] [phone]           MICROSCOPIC    Helicobacter organisms are identified
in the first specimen by Giemsa stain.    No discrete areas of goblet cell met
aplasia are identified in the esophageal   biopsy by Alcian blue stain.  All of 
the stains, including any controls   performed, stain appropriately.       MICRO
SCOPIC PERFORMED AT [facility]   [address]
[place] [address] [phone]         PERFORMING SITE  
 Diagnosis performed at:        [facility]        [address] [facility] [place] [address] [phone]----------------------------------------------------------
---------------------------------- Signed SIGNATURE ON FILE                     
  Nico De La Fuente MD 19 1400  ------------------------------------------
--------------------------------------------------                              
      ** END OF REPORT ** [id number] 11:19:00* 



             Test Item    Value        Reference Range Interpretation Comments

 

             GLUBED (test code = GLUBED) 125 mg/dL           H            

Performed by certified  

at [facility]





COMPREHENSIVE METABOLIC [id number] 08:06:00* 



             Test Item    Value        Reference Range Interpretation Comments

 

             SODIUM (test code = NA) 141 mmol/L   136-145      N             

 

             POTASSIUM (test code = K) 3.4 mmol/L   3.5-5.1      L             

 

             CHLORIDE (test code = CL) 108.0 mmol/L        H             

 

             CARBON DIOXIDE (test code = CO2) 24.0 mmol/L  21-32        N       

      

 

             ANION GAP (test code = GAP) 12.4         10-20        N            

 

 

             GLUCOSE (test code = GLU) 162 mg/dL           H             

 

             BLOOD UREA NITROGEN (test code = BUN) 14 mg/dL     7-18         N  

           

 

             GLOMERULAR FILTRATION RATE (test code = GFR) > 60 mL/min  >=60     

                 Estimated GFR by

using Modified MDRD formula.Chronic kidney disease is defined as either kidney 
damageor GFR <60 mL/min/1.73 m2 for >3 months.

 

             CREATININE (test code = CREAT) 0.70 mg/dL   0.55-1.02    N         

   **Note change in reference

range due to change in reagent.**

 

             BUN/CREATININE RATIO (test code = BUN/CREA) 21.2         10-20     

   H             

 

             TOTAL PROTEIN (test code = PROT) 6.5 gram/dL  6.4-8.2      N       

      

 

             ALBUMIN (test code = ALB) 2.4 g/dL     3.4-5.0      L             

 

             GLOBULIN (test code = GLOB) 4.1 gram/dL  2.7-4.2      N            

 

 

             ALBUMIN/GLOBULIN RATIO (test code = A/G) 0.6          0.75-1.50    

L             

 

             CALCIUM (test code = CA) 9.2 mg/dL    8.5-10.1     N             

 

             BILIRUBIN TOTAL (test code = BILT) 0.70 mg/dL   0.0-1.0      N     

        

 

             SGOT/AST (test code = AST) 49 IUnit/L   15-37        H             

 

             SGPT/ALT (test code = ALT) 69 IUnit/L   12-78        N             

 

             ALKALINE PHOSPHATASE TOTAL (test code = ALKP) 100 IUnit/L    

     N            **Note change

in reference range due to change in reagent.**





COMPREHENSIVE METABOLIC [id number] 07:57:00* 



             Test Item    Value        Reference Range Interpretation Comments

 

             SODIUM (test code = NA) 141 mmol/L   136-145      N             

 

             POTASSIUM (test code = K) 3.4 mmol/L   3.5-5.1      L             

 

             CHLORIDE (test code = CL) 108.0 mmol/L        H             

 

             CARBON DIOXIDE (test code = CO2)  mmol/L      21-32                

      

 

             ANION GAP (test code = GAP)              10-20                     

 

 

             GLUCOSE (test code = GLU)  mg/dL                            

 

             BLOOD UREA NITROGEN (test code = BUN)  mg/dL       7-18            

           

 

             GLOMERULAR FILTRATION RATE (test code = GFR)  mL/min      >=60     

                  

 

             CREATININE (test code = CREAT)  mg/dL       0.55-1.02              

    

 

             BUN/CREATININE RATIO (test code = BUN/CREA)              10-20     

                 

 

             TOTAL PROTEIN (test code = PROT)  gram/dL     6.4-8.2              

      

 

             ALBUMIN (test code = ALB)  g/dL        3.4-5.0                    

 

             GLOBULIN (test code = GLOB)  gram/dL     2.7-4.2                   

 

 

             ALBUMIN/GLOBULIN RATIO (test code = A/G)              0.75-1.50    

              

 

             CALCIUM (test code = CA)  mg/dL       8.5-10.1                   

 

             BILIRUBIN TOTAL (test code = BILT)  mg/dL       0.0-1.0            

        

 

             SGOT/AST (test code = AST)  IUnit/L     15-37                      

 

             SGPT/ALT (test code = ALT)  IUnit/L     12-78                      

 

             ALKALINE PHOSPHATASE TOTAL (test code = ALKP)  IUnit/L       

                   





[id number] 07:38:00* 



             Test Item    Value        Reference Range Interpretation Comments

 

             GLUBED (test code = GLUBED) 172 mg/dL           H            

Performed by certified  

at [facility]





CBC W/AUTO [id number] 07:32:00* 



             Test Item    Value        Reference Range Interpretation Comments

 

             WHITE BLOOD CELL (test code = WBC) 8.4 K/mm3    4.5-12.5     N     

        

 

             RED BLOOD CELL (test code = RBC) 4.66 mill/mm3 3.7-5.2      N      

       

 

             HEMOGLOBIN (test code = HGB) 13.4 gram/dL 11.5-15.5    N           

  

 

             HEMATOCRIT (test code = HCT) 42.1 %       36.0-46.0    N           

  

 

             MEAN CELL VOLUME (test code = MCV) 90.3 fL      80-98        N     

        

 

             MEAN CELL HGB (test code = MCH) 28.8 picogram 27.0-33.0    N       

      

 

             MEAN CELL HGB CONCETRATION (test code = MCHC) 31.8 gram/dL 33.0-36.

0    L             

 

             RED CELL DISTRIBUTION WIDTH (test code = RDW) 13.1 %       11.6-16.

2    N             

 

             RED CELL DISTRIBUTION WIDTH SD (test code = RDW-SD) 43.8 fL      37

.0-51.0    N             

 

             PLATELET COUNT (test code = PLT) 199 K/mm3    150-450      N       

      

 

             MEAN PLATELET VOLUME (test code = MPV) 11.2 fL      6.7-11.0     H 

            

 

             NEUTROPHIL % (test code = NT%) 63.1 %       39.0-69.0    N         

    

 

             IMMATURE GRANULOCYTE % (test code = IG%) 0.4 %        0.0-5.0      

N             

 

             LYMPHOCYTE % (test code = LY%) 28.9 %       25.0-55.0    N         

    

 

             MONOCYTE % (test code = MO%) 5.8 %        0.0-10.0     N           

  

 

             EOSINOPHIL % (test code = EO%) 1.3 %        0.0-5.0      N         

    

 

             BASOPHIL % (test code = BA%) 0.5 %        0.0-1.0      N           

  

 

             NUCLEATED RBC % (test code = NRBC%) 0.0 %        0-0          N    

         

 

             NEUTROPHIL # (test code = NT#) 5.30 K/mm3   1.8-7.7      N         

    

 

             IMMATURE GRANULOCYTE # (test code = IG#) 0.03 x10 3/uL 0-0.03      

 N             

 

             LYMPHOCYTE # (test code = LY#) 2.43 K/mm3   1.0-5.0      N         

    

 

             MONOCYTE # (test code = MO#) 0.49 K/mm3   0-0.8        N           

  

 

             EOSINOPHIL # (test code = EO#) 0.11 K/mm3   0.0-0.5      N         

    

 

             BASOPHIL # (test code = BA#) 0.04 K/mm3   0.0-0.2      N           

  

 

             NUCLEATED RBC # (test code = NRBC#) 0.00 K/mm3   0.0-0.1      N    

         

 

             MANUAL DIFF REQUIRED (test code = MDIFF) NO                        

              





CBC W/AUTO [id number] 07:30:00* 



             Test Item    Value        Reference Range Interpretation Comments

 

             WHITE BLOOD CELL (test code = WBC)  K/mm3       4.5-12.5           

        

 

             RED BLOOD CELL (test code = RBC)  mill/mm3    3.7-5.2              

      

 

             HEMOGLOBIN (test code = HGB) 13.4 gram/dL 11.5-15.5    N           

  

 

             HEMATOCRIT (test code = HCT) 42.1 %       36.0-46.0    N           

  

 

             MEAN CELL VOLUME (test code = MCV)  fL          80-98              

        

 

             MEAN CELL HGB (test code = MCH)  picogram    27.0-33.0             

     

 

             MEAN CELL HGB CONCETRATION (test code = MCHC)  gram/dL     33.0-36.

0                  

 

             RED CELL DISTRIBUTION WIDTH (test code = RDW)  %           11.6-16.

2                  

 

             RED CELL DISTRIBUTION WIDTH SD (test code = RDW-SD)  fL          37

.0-51.0                  

 

             PLATELET COUNT (test code = PLT)  K/mm3       150-450              

      

 

             MEAN PLATELET VOLUME (test code = MPV)  fL          6.7-11.0       

            

 

             NEUTROPHIL % (test code = NT%)  %           39.0-69.0              

    

 

             IMMATURE GRANULOCYTE % (test code = IG%)  %           0.0-5.0      

              

 

             LYMPHOCYTE % (test code = LY%)  %           25.0-55.0              

    

 

             MONOCYTE % (test code = MO%)  %           0.0-10.0                 

  

 

             EOSINOPHIL % (test code = EO%)  %           0.0-5.0                

    

 

             BASOPHIL % (test code = BA%)  %           0.0-1.0                  

  

 

             NEUTROPHIL # (test code = NT#)  K/mm3       1.8-7.7                

    

 

             LYMPHOCYTE # (test code = LY#)  K/mm3       1.0-5.0                

    

 

             MONOCYTE # (test code = MO#)  K/mm3       0-0.8                    

  

 

             EOSINOPHIL # (test code = EO#)  K/mm3       0.0-0.5                

    

 

             BASOPHIL # (test code = BA#)  K/mm3       0.0-0.2                  

  





[id number] 20:51:00* 



             Test Item    Value        Reference Range Interpretation Comments

 

             GLUBED (test code = GLUBED) 155 mg/dL           H            

Performed by certified  

at [facility]





[id number] 16:49:00* 



             Test Item    Value        Reference Range Interpretation Comments

 

             GLUBED (test code = GLUBED) 286 mg/dL           H            

Performed by certified  

at [facility]





[id number] 11:53:00* 



             Test Item    Value        Reference Range Interpretation Comments

 

             GLUBED (test code = GLUBED) 135 mg/dL           H            

Performed by certified  

at [facility]





[id number] 07:46:00* 



             Test Item    Value        Reference Range Interpretation Comments

 

             GLUBED (test code = GLUBED) 212 mg/dL           H            

Performed by certified  

at [facility]





COMPREHENSIVE METABOLIC [id number] 06:55:00* 



             Test Item    Value        Reference Range Interpretation Comments

 

             SODIUM (test code = NA) 140 mmol/L   136-145      N             

 

             POTASSIUM (test code = K) 3.5 mmol/L   3.5-5.1      N             

 

             CHLORIDE (test code = CL) 109.0 mmol/L        H             

 

             CARBON DIOXIDE (test code = CO2) 22.0 mmol/L  21-32        N       

      

 

             ANION GAP (test code = GAP) 12.5         10-20        N            

 

 

             GLUCOSE (test code = GLU) 196 mg/dL           H             

 

             BLOOD UREA NITROGEN (test code = BUN) 14 mg/dL     7-18         N  

           

 

             GLOMERULAR FILTRATION RATE (test code = GFR) > 60 mL/min  >=60     

                 Estimated GFR by

using Modified MDRD formula.Chronic kidney disease is defined as either kidney 
damageor GFR <60 mL/min/1.73 m2 for >3 months.

 

             CREATININE (test code = CREAT) 0.70 mg/dL   0.55-1.02    N         

   **Note change in reference

range due to change in reagent.**

 

             BUN/CREATININE RATIO (test code = BUN/CREA) 20.5         10-20     

   H             

 

             TOTAL PROTEIN (test code = PROT) 6.8 gram/dL  6.4-8.2      N       

      

 

             ALBUMIN (test code = ALB) 2.5 g/dL     3.4-5.0      L             

 

             GLOBULIN (test code = GLOB) 4.3 gram/dL  2.7-4.2      H            

 

 

             ALBUMIN/GLOBULIN RATIO (test code = A/G) 0.6          0.75-1.50    

L             

 

             CALCIUM (test code = CA) 9.4 mg/dL    8.5-10.1     N             

 

             BILIRUBIN TOTAL (test code = BILT) 0.70 mg/dL   0.0-1.0      N     

        

 

             SGOT/AST (test code = AST) 68 IUnit/L   15-37        H             

 

             SGPT/ALT (test code = ALT) 91 IUnit/L   12-78        H             

 

             ALKALINE PHOSPHATASE TOTAL (test code = ALKP) 102 IUnit/L    

     N            **Note change

in reference range due to change in reagent.**





COMPREHENSIVE METABOLIC [id number] 06:50:00* 



             Test Item    Value        Reference Range Interpretation Comments

 

             SODIUM (test code = NA) 140 mmol/L   136-145      N             

 

             POTASSIUM (test code = K) 3.5 mmol/L   3.5-5.1      N             

 

             CHLORIDE (test code = CL) 109.0 mmol/L        H             

 

             CARBON DIOXIDE (test code = CO2)  mmol/L      21-32                

      

 

             ANION GAP (test code = GAP)              10-20                     

 

 

             GLUCOSE (test code = GLU)  mg/dL                            

 

             BLOOD UREA NITROGEN (test code = BUN)  mg/dL       7-18            

           

 

             GLOMERULAR FILTRATION RATE (test code = GFR)  mL/min      >=60     

                  

 

             CREATININE (test code = CREAT)  mg/dL       0.55-1.02              

    

 

             BUN/CREATININE RATIO (test code = BUN/CREA)              10-20     

                 

 

             TOTAL PROTEIN (test code = PROT)  gram/dL     6.4-8.2              

      

 

             ALBUMIN (test code = ALB)  g/dL        3.4-5.0                    

 

             GLOBULIN (test code = GLOB)  gram/dL     2.7-4.2                   

 

 

             ALBUMIN/GLOBULIN RATIO (test code = A/G)              0.75-1.50    

              

 

             CALCIUM (test code = CA)  mg/dL       8.5-10.1                   

 

             BILIRUBIN TOTAL (test code = BILT)  mg/dL       0.0-1.0            

        

 

             SGOT/AST (test code = AST)  IUnit/L     15-37                      

 

             SGPT/ALT (test code = ALT)  IUnit/L     12-78                      

 

             ALKALINE PHOSPHATASE TOTAL (test code = ALKP)  IUnit/L       

                   





[id number] 20:44:00* 



             Test Item    Value        Reference Range Interpretation Comments

 

             GLUBED (test code = GLUBED) 206 mg/dL           H            

Performed by certified  

at [facility]





HGB   [id number] 17:45:00* 



             Test Item    Value        Reference Range Interpretation Comments

 

             HEMOGLOBIN (test code = HGB) 14.2 gram/dL 11.5-15.5    N           

  

 

             HEMATOCRIT (test code = HCT) 44.4 %       36.0-46.0    N           

  





[id number] 16:39:00* 



             Test Item    Value        Reference Range Interpretation Comments

 

             GLUBED (test code = GLUBED) 282 mg/dL           H            

Performed by certified  

at [facility]





[id number] 13:10:00* 



             Test Item    Value        Reference Range Interpretation Comments

 

             GLUBED (test code = GLUBED) 269 mg/dL           H            

Performed by certified  

at [facility]





[id number] 13:10:00* 



             Test Item    Value        Reference Range Interpretation Comments

 

             GLUBED (test code = GLUBED) 234 mg/dL           H            

Performed by certified  

at [facility]





COMPREHENSIVE METABOLIC [id number] 08:03:00* 



             Test Item    Value        Reference Range Interpretation Comments

 

             SODIUM (test code = NA) 142 mmol/L   136-145      N             

 

             POTASSIUM (test code = K) 3.7 mmol/L   3.5-5.1      N             

 

             CHLORIDE (test code = CL) 111.0 mmol/L        H             

 

             CARBON DIOXIDE (test code = CO2) 23.0 mmol/L  21-32        N       

      

 

             ANION GAP (test code = GAP) 11.7         10-20        N            

 

 

             GLUCOSE (test code = GLU) 195 mg/dL           H             

 

             BLOOD UREA NITROGEN (test code = BUN) 13 mg/dL     7-18         N  

           

 

             GLOMERULAR FILTRATION RATE (test code = GFR) > 60 mL/min  >=60     

                 Estimated GFR by

using Modified MDRD formula.Chronic kidney disease is defined as either kidney 
damageor GFR <60 mL/min/1.73 m2 for >3 months.

 

             CREATININE (test code = CREAT) 0.80 mg/dL   0.55-1.02    N         

   **Note change in reference

range due to change in reagent.**

 

             BUN/CREATININE RATIO (test code = BUN/CREA) 16.6         10-20     

   N             

 

             TOTAL PROTEIN (test code = PROT) 6.8 gram/dL  6.4-8.2      N       

      

 

             ALBUMIN (test code = ALB) 2.6 g/dL     3.4-5.0      L             

 

             GLOBULIN (test code = GLOB) 4.2 gram/dL  2.7-4.2      N            

 

 

             ALBUMIN/GLOBULIN RATIO (test code = A/G) 0.6          0.75-1.50    

L             

 

             CALCIUM (test code = CA) 8.6 mg/dL    8.5-10.1     N             

 

             BILIRUBIN TOTAL (test code = BILT) 0.60 mg/dL   0.0-1.0      N     

        

 

             SGOT/AST (test code = AST) 136 IUnit/L  15-37        H             

 

             SGPT/ALT (test code = ALT) 121 IUnit/L  12-78        H             

 

             ALKALINE PHOSPHATASE TOTAL (test code = ALKP) 103 IUnit/L    

     N            **Note change

in reference range due to change in reagent.**





CBC W/AUTO [id number] 07:48:00* 



             Test Item    Value        Reference Range Interpretation Comments

 

             WHITE BLOOD CELL (test code = WBC) 11.8 K/mm3   4.5-12.5     N     

        

 

             RED BLOOD CELL (test code = RBC) 4.80 mill/mm3 3.7-5.2      N      

       

 

             HEMOGLOBIN (test code = HGB) 13.8 gram/dL 11.5-15.5    N           

  

 

             HEMATOCRIT (test code = HCT) 43.0 %       36.0-46.0    N           

  

 

             MEAN CELL VOLUME (test code = MCV) 89.6 fL      80-98        N     

        

 

             MEAN CELL HGB (test code = MCH) 28.8 picogram 27.0-33.0    N       

      

 

             MEAN CELL HGB CONCETRATION (test code = MCHC) 32.1 gram/dL 33.0-36.

0    L             

 

             RED CELL DISTRIBUTION WIDTH (test code = RDW) 13.0 %       11.6-16.

2    N             

 

             RED CELL DISTRIBUTION WIDTH SD (test code = RDW-SD) 42.6 fL      37

.0-51.0    N             

 

             PLATELET COUNT (test code = PLT) 221 K/mm3    150-450      N       

      

 

             MEAN PLATELET VOLUME (test code = MPV) 10.9 fL      6.7-11.0     N 

            

 

             NEUTROPHIL % (test code = NT%) 73.2 %       39.0-69.0    H         

    

 

             IMMATURE GRANULOCYTE % (test code = IG%) 0.6 %        0.0-5.0      

N             

 

             LYMPHOCYTE % (test code = LY%) 19.8 %       25.0-55.0    L         

    

 

             MONOCYTE % (test code = MO%) 5.9 %        0.0-10.0     N           

  

 

             EOSINOPHIL % (test code = EO%) 0.2 %        0.0-5.0      N         

    

 

             BASOPHIL % (test code = BA%) 0.3 %        0.0-1.0      N           

  

 

             NUCLEATED RBC % (test code = NRBC%) 0.0 %        0-0          N    

         

 

             NEUTROPHIL # (test code = NT#) 8.67 K/mm3   1.8-7.7      H         

    

 

             IMMATURE GRANULOCYTE # (test code = IG#) 0.07 x10 3/uL 0-0.03      

 H             

 

             LYMPHOCYTE # (test code = LY#) 2.34 K/mm3   1.0-5.0      N         

    

 

             MONOCYTE # (test code = MO#) 0.70 K/mm3   0-0.8        N           

  

 

             EOSINOPHIL # (test code = EO#) 0.02 K/mm3   0.0-0.5      N         

    

 

             BASOPHIL # (test code = BA#) 0.03 K/mm3   0.0-0.2      N           

  

 

             NUCLEATED RBC # (test code = NRBC#) 0.00 K/mm3   0.0-0.1      N    

         





[id number] 07:41:00* 



             Test Item    Value        Reference Range Interpretation Comments

 

             GLUBED (test code = GLUBED) 179 mg/dL           H            

Performed by certified  

at [facility]





[id number] 20:18:00* 



             Test Item    Value        Reference Range Interpretation Comments

 

             GLUBED (test code = GLUBED) 268 mg/dL           H            

Performed by certified  

at [facility]





[id number] 18:31:00* 



             Test Item    Value        Reference Range Interpretation Comments

 

             GLUBED (test code = GLUBED) 257 mg/dL           H            

Performed by certified  

at [facility]





[id number] 16:44:00* 



             Test Item    Value        Reference Range Interpretation Comments

 

             GLUBED (test code = GLUBED) 213 mg/dL           H            

Performed by certified  

at [facility]





[id number] 11:45:00* 



             Test Item    Value        Reference Range Interpretation Comments

 

             GLUBED (test code = GLUBED) 141 mg/dL           H            

Performed by certified  

at [facility]





[id number] 08:07:00* 



             Test Item    Value        Reference Range Interpretation Comments

 

             GLUBED (test code = GLUBED) 234 mg/dL           H            

Performed by certified  

at [facility]





COMPREHENSIVE METABOLIC [id number] 07:07:00* 



             Test Item    Value        Reference Range Interpretation Comments

 

             SODIUM (test code = NA) 140 mmol/L   136-145      N             

 

             POTASSIUM (test code = K) 3.3 mmol/L   3.5-5.1      L             

 

             CHLORIDE (test code = CL) 107.0 mmol/L        N             

 

             CARBON DIOXIDE (test code = CO2) 25.0 mmol/L  21-32        N       

      

 

             ANION GAP (test code = GAP) 11.3         10-20        N            

 

 

             GLUCOSE (test code = GLU) 242 mg/dL           H             

 

             BLOOD UREA NITROGEN (test code = BUN) 10 mg/dL     7-18         N  

           

 

             GLOMERULAR FILTRATION RATE (test code = GFR) > 60 mL/min  >=60     

                 Estimated GFR by

using Modified MDRD formula.Chronic kidney disease is defined as either kidney 
damageor GFR <60 mL/min/1.73 m2 for >3 months.

 

             CREATININE (test code = CREAT) 0.60 mg/dL   0.55-1.02    N         

   **Note change in reference

range due to change in reagent.**

 

             BUN/CREATININE RATIO (test code = BUN/CREA) 15.5         10-20     

   N             

 

             TOTAL PROTEIN (test code = PROT) 6.8 gram/dL  6.4-8.2      N       

      

 

             ALBUMIN (test code = ALB) 2.7 g/dL     3.4-5.0      L             

 

             GLOBULIN (test code = GLOB) 4.1 gram/dL  2.7-4.2      N            

 

 

             ALBUMIN/GLOBULIN RATIO (test code = A/G) 0.7          0.75-1.50    

L             

 

             CALCIUM (test code = CA) 8.7 mg/dL    8.5-10.1     N             

 

             BILIRUBIN TOTAL (test code = BILT) 0.60 mg/dL   0.0-1.0      N     

        

 

             SGOT/AST (test code = AST) 49 IUnit/L   15-37        H             

 

             SGPT/ALT (test code = ALT) 70 IUnit/L   12-78        N             

 

             ALKALINE PHOSPHATASE TOTAL (test code = ALKP) 113 IUnit/L    

     N            **Note change

in reference range due to change in reagent.**





COMPREHENSIVE METABOLIC [id number] 06:56:00* 



             Test Item    Value        Reference Range Interpretation Comments

 

             SODIUM (test code = NA) 140 mmol/L   136-145      N             

 

             POTASSIUM (test code = K) 3.3 mmol/L   3.5-5.1      L             

 

             CHLORIDE (test code = CL) 107.0 mmol/L        N             

 

             CARBON DIOXIDE (test code = CO2)  mmol/L      21-32                

      

 

             ANION GAP (test code = GAP)              10-20                     

 

 

             GLUCOSE (test code = GLU)  mg/dL                            

 

             BLOOD UREA NITROGEN (test code = BUN)  mg/dL       7-18            

           

 

             GLOMERULAR FILTRATION RATE (test code = GFR)  mL/min      >=60     

                  

 

             CREATININE (test code = CREAT)  mg/dL       0.55-1.02              

    

 

             BUN/CREATININE RATIO (test code = BUN/CREA)              10-20     

                 

 

             TOTAL PROTEIN (test code = PROT)  gram/dL     6.4-8.2              

      

 

             ALBUMIN (test code = ALB)  g/dL        3.4-5.0                    

 

             GLOBULIN (test code = GLOB)  gram/dL     2.7-4.2                   

 

 

             ALBUMIN/GLOBULIN RATIO (test code = A/G)              0.75-1.50    

              

 

             CALCIUM (test code = CA)  mg/dL       8.5-10.1                   

 

             BILIRUBIN TOTAL (test code = BILT)  mg/dL       0.0-1.0            

        

 

             SGOT/AST (test code = AST)  IUnit/L     15-37                      

 

             SGPT/ALT (test code = ALT)  IUnit/L     12-78                      

 

             ALKALINE PHOSPHATASE TOTAL (test code = ALKP)  IUnit/L       

                   





CBC W/AUTO [id number] 06:49:00* 



             Test Item    Value        Reference Range Interpretation Comments

 

             WHITE BLOOD CELL (test code = WBC) 10.3 K/mm3   4.5-12.5     N     

        

 

             RED BLOOD CELL (test code = RBC) 5.04 mill/mm3 3.7-5.2      N      

       

 

             HEMOGLOBIN (test code = HGB) 14.7 gram/dL 11.5-15.5    N           

  

 

             HEMATOCRIT (test code = HCT) 44.0 %       36.0-46.0    N           

  

 

             MEAN CELL VOLUME (test code = MCV) 87.3 fL      80-98        N     

        

 

             MEAN CELL HGB (test code = MCH) 29.2 picogram 27.0-33.0    N       

      

 

             MEAN CELL HGB CONCETRATION (test code = MCHC) 33.4 gram/dL 33.0-36.

0    N             

 

             RED CELL DISTRIBUTION WIDTH (test code = RDW) 12.9 %       11.6-16.

2    N             

 

             RED CELL DISTRIBUTION WIDTH SD (test code = RDW-SD) 40.9 fL      37

.0-51.0    N             

 

             PLATELET COUNT (test code = PLT) 211 K/mm3    150-450      N       

      

 

             MEAN PLATELET VOLUME (test code = MPV) 11.5 fL      6.7-11.0     H 

            

 

             NEUTROPHIL % (test code = NT%) 73.1 %       39.0-69.0    H         

    

 

             IMMATURE GRANULOCYTE % (test code = IG%) 0.4 %        0.0-5.0      

N             

 

             LYMPHOCYTE % (test code = LY%) 21.5 %       25.0-55.0    L         

    

 

             MONOCYTE % (test code = MO%) 4.3 %        0.0-10.0     N           

  

 

             EOSINOPHIL % (test code = EO%) 0.5 %        0.0-5.0      N         

    

 

             BASOPHIL % (test code = BA%) 0.2 %        0.0-1.0      N           

  

 

             NUCLEATED RBC % (test code = NRBC%) 0.0 %        0-0          N    

         

 

             NEUTROPHIL # (test code = NT#) 7.52 K/mm3   1.8-7.7      N         

    

 

             IMMATURE GRANULOCYTE # (test code = IG#) 0.04 x10 3/uL 0-0.03      

 H             

 

             LYMPHOCYTE # (test code = LY#) 2.21 K/mm3   1.0-5.0      N         

    

 

             MONOCYTE # (test code = MO#) 0.44 K/mm3   0-0.8        N           

  

 

             EOSINOPHIL # (test code = EO#) 0.05 K/mm3   0.0-0.5      N         

    

 

             BASOPHIL # (test code = BA#) 0.02 K/mm3   0.0-0.2      N           

  

 

             NUCLEATED RBC # (test code = NRBC#) 0.00 K/mm3   0.0-0.1      N    

         

 

             MANUAL DIFF REQUIRED (test code = MDIFF) NO                        

              





ACUTE HEPATITIS [id number] 06:09:00* 



             Test Item    Value        Reference Range Interpretation Comments

 

             AB HEPATITIS A IGM (test code = HAVMAB) Negative     Negative      

             

 

             AG HEPAT B SURF (test code = HBSAG) Negative     Negative          

         

 

             HEPATITIS B CORE ANTIBODY,IGM (test code = HBCMAB) Negative     Neg

ative                   

 

             AB HEPATITIS C (test code = HCVAB) <0.1         0.0-0.9            

       INFCE Result Units: s/co ratio

                                 Negative:     < 0.8                            
Indeterminate: 0.8 - 0.9                                  Positive:     > 0.9 
The CDC recommends that a positive HCV antibody result be followed up with a HCV
Nucleic Acid Amplification test (760856).Performed At:  [facility] [id number] Mauricio MORALES MD Ph:[phone]





[id number] 20:45:00* 



             Test Item    Value        Reference Range Interpretation Comments

 

             GLUBED (test code = GLUBED) 215 mg/dL           H            

Performed by certified  

at [facility]





[id number] 11:58:00* 



             Test Item    Value        Reference Range Interpretation Comments

 

             GLUBED (test code = GLUBED) 266 mg/dL           H            

Performed by certified  

at [facility]





[id number] 07:29:00* 



             Test Item    Value        Reference Range Interpretation Comments

 

             GLUBED (test code = GLUBED) 303 mg/dL           H            

Performed by certified  

at [facility]





COMPREHENSIVE METABOLIC [id number] 07:01:00* 



             Test Item    Value        Reference Range Interpretation Comments

 

             SODIUM (test code = NA) 140 mmol/L   136-145      N             

 

             POTASSIUM (test code = K) 3.5 mmol/L   3.5-5.1      N             

 

             CHLORIDE (test code = CL) 105.0 mmol/L        N             

 

             CARBON DIOXIDE (test code = CO2) 26.0 mmol/L  21-32        N       

      

 

             ANION GAP (test code = GAP) 12.5         10-20        N            

 

 

             GLUCOSE (test code = GLU) 298 mg/dL           H             

 

             BLOOD UREA NITROGEN (test code = BUN) 9 mg/dL      7-18         N  

           

 

             GLOMERULAR FILTRATION RATE (test code = GFR) > 60 mL/min  >=60     

                 Estimated GFR by

using Modified MDRD formula.Chronic kidney disease is defined as either kidney 
damageor GFR <60 mL/min/1.73 m2 for >3 months.

 

             CREATININE (test code = CREAT) 0.80 mg/dL   0.55-1.02    N         

   **Note change in reference

range due to change in reagent.**

 

             BUN/CREATININE RATIO (test code = BUN/CREA) 11.7         10-20     

   N             

 

             TOTAL PROTEIN (test code = PROT) 6.4 gram/dL  6.4-8.2      N       

      

 

             ALBUMIN (test code = ALB) 2.6 g/dL     3.4-5.0      L             

 

             GLOBULIN (test code = GLOB) 3.8 gram/dL  2.7-4.2      N            

 

 

             ALBUMIN/GLOBULIN RATIO (test code = A/G) 0.7          0.75-1.50    

L             

 

             CALCIUM (test code = CA) 8.9 mg/dL    8.5-10.1     N             

 

             BILIRUBIN TOTAL (test code = BILT) 0.50 mg/dL   0.0-1.0      N     

        

 

             SGOT/AST (test code = AST) 59 IUnit/L   15-37        H             

 

             SGPT/ALT (test code = ALT) 85 IUnit/L   12-78        H             

 

             ALKALINE PHOSPHATASE TOTAL (test code = ALKP) 117 IUnit/L    

     N            **Note change

in reference range due to change in reagent.**





COMPREHENSIVE METABOLIC [id number] 06:46:00* 



             Test Item    Value        Reference Range Interpretation Comments

 

             SODIUM (test code = NA) 140 mmol/L   136-145      N             

 

             POTASSIUM (test code = K) 3.5 mmol/L   3.5-5.1      N             

 

             CHLORIDE (test code = CL) 105.0 mmol/L        N             

 

             CARBON DIOXIDE (test code = CO2)  mmol/L      21-32                

      

 

             ANION GAP (test code = GAP)              10-20                     

 

 

             GLUCOSE (test code = GLU)  mg/dL                            

 

             BLOOD UREA NITROGEN (test code = BUN)  mg/dL       7-18            

           

 

             GLOMERULAR FILTRATION RATE (test code = GFR)  mL/min      >=60     

                  

 

             CREATININE (test code = CREAT)  mg/dL       0.55-1.02              

    

 

             BUN/CREATININE RATIO (test code = BUN/CREA)              10-20     

                 

 

             TOTAL PROTEIN (test code = PROT)  gram/dL     6.4-8.2              

      

 

             ALBUMIN (test code = ALB)  g/dL        3.4-5.0                    

 

             GLOBULIN (test code = GLOB)  gram/dL     2.7-4.2                   

 

 

             ALBUMIN/GLOBULIN RATIO (test code = A/G)              0.75-1.50    

              

 

             CALCIUM (test code = CA)  mg/dL       8.5-10.1                   

 

             BILIRUBIN TOTAL (test code = BILT)  mg/dL       0.0-1.0            

        

 

             SGOT/AST (test code = AST)  IUnit/L     15-37                      

 

             SGPT/ALT (test code = ALT)  IUnit/L     12-78                      

 

             ALKALINE PHOSPHATASE TOTAL (test code = ALKP)  IUnit/L       

                   





PROTHROMBIN [id number] 06:26:00* 



             Test Item    Value        Reference Range Interpretation Comments

 

             PROTHROMBIN TIME PATIENT (test code = PTP) 10.0 seconds 9.0-14.0   

  N             

 

             INTERNATIONAL NORMAL RATIO (test code = INR) 0.8          0.8-1.2  

    N            The therapeutic range

for oral anticoagulant therapy [employer] indications is an international 
normalized ratio (INR)of between 2.0 and 3.0.  The recommended therapeutic 
INRrange for various clinical situations is listed 
below:_________________________________________________________Clinical 
Situation                          INR 
range_________________________________________________________ Pulmonary e
mbolism treatment              (2.0-3.0)Venous thrombosis treatmentVenous 
thrombosis prophylaxis (high risk surgery)Prevention of systemic embolism from: 
       Acute myocardial infarction         Valvular heart disease         Atrial
fibrillation Mechanical prosthetic heart valves          (2.5-3.5)





IS PATIENT ON ANTICOAGULANTS? NCBC W/AUTO [id number] 06:20:00* 



             Test Item    Value        Reference Range Interpretation Comments

 

             WHITE BLOOD CELL (test code = WBC) 7.7 K/mm3    4.5-12.5     N     

        

 

             RED BLOOD CELL (test code = RBC) 5.15 mill/mm3 3.7-5.2      N      

       

 

             HEMOGLOBIN (test code = HGB) 14.7 gram/dL 11.5-15.5    N           

  

 

             HEMATOCRIT (test code = HCT) 44.3 %       36.0-46.0    N           

  

 

             MEAN CELL VOLUME (test code = MCV) 86.0 fL      80-98        N     

        

 

             MEAN CELL HGB (test code = MCH) 28.5 picogram 27.0-33.0    N       

      

 

             MEAN CELL HGB CONCETRATION (test code = MCHC) 33.2 gram/dL 33.0-36.

0    N             

 

             RED CELL DISTRIBUTION WIDTH (test code = RDW) 12.7 %       11.6-16.

2    N             

 

             RED CELL DISTRIBUTION WIDTH SD (test code = RDW-SD) 39.3 fL      37

.0-51.0    N             

 

             PLATELET COUNT (test code = PLT) 191 K/mm3    150-450      N       

      

 

             MEAN PLATELET VOLUME (test code = MPV) 11.2 fL      6.7-11.0     H 

            

 

             NEUTROPHIL % (test code = NT%) 51.9 %       39.0-69.0    N         

    

 

             IMMATURE GRANULOCYTE % (test code = IG%) 0.6 %        0.0-5.0      

N             

 

             LYMPHOCYTE % (test code = LY%) 39.3 %       25.0-55.0    N         

    

 

             MONOCYTE % (test code = MO%) 6.2 %        0.0-10.0     N           

  

 

             EOSINOPHIL % (test code = EO%) 1.7 %        0.0-5.0      N         

    

 

             BASOPHIL % (test code = BA%) 0.3 %        0.0-1.0      N           

  

 

             NUCLEATED RBC % (test code = NRBC%) 0.0 %        0-0          N    

         

 

             NEUTROPHIL # (test code = NT#) 4.00 K/mm3   1.8-7.7      N         

    

 

             IMMATURE GRANULOCYTE # (test code = IG#) 0.05 x10 3/uL 0-0.03      

 H             

 

             LYMPHOCYTE # (test code = LY#) 3.03 K/mm3   1.0-5.0      N         

    

 

             MONOCYTE # (test code = MO#) 0.48 K/mm3   0-0.8        N           

  

 

             EOSINOPHIL # (test code = EO#) 0.13 K/mm3   0.0-0.5      N         

    

 

             BASOPHIL # (test code = BA#) 0.02 K/mm3   0.0-0.2      N           

  

 

             NUCLEATED RBC # (test code = NRBC#) 0.00 K/mm3   0.0-0.1      N    

         





CBC W/AUTO [id number] 06:14:00* 



             Test Item    Value        Reference Range Interpretation Comments

 

             WHITE BLOOD CELL (test code = WBC)  K/mm3       4.5-12.5           

        

 

             RED BLOOD CELL (test code = RBC)  mill/mm3    3.7-5.2              

      

 

             HEMOGLOBIN (test code = HGB) 14.7 gram/dL 11.5-15.5    N           

  

 

             HEMATOCRIT (test code = HCT) 44.3 %       36.0-46.0    N           

  

 

             MEAN CELL VOLUME (test code = MCV)  fL          80-98              

        

 

             MEAN CELL HGB (test code = MCH)  picogram    27.0-33.0             

     

 

             MEAN CELL HGB CONCETRATION (test code = MCHC)  gram/dL     33.0-36.

0                  

 

             RED CELL DISTRIBUTION WIDTH (test code = RDW)  %           11.6-16.

2                  

 

             RED CELL DISTRIBUTION WIDTH SD (test code = RDW-SD)  fL          37

.0-51.0                  

 

             PLATELET COUNT (test code = PLT)  K/mm3       150-450              

      

 

             MEAN PLATELET VOLUME (test code = MPV)  fL          6.7-11.0       

            

 

             NEUTROPHIL % (test code = NT%)  %           39.0-69.0              

    

 

             IMMATURE GRANULOCYTE % (test code = IG%)  %           0.0-5.0      

              

 

             LYMPHOCYTE % (test code = LY%)  %           25.0-55.0              

    

 

             MONOCYTE % (test code = MO%)  %           0.0-10.0                 

  

 

             EOSINOPHIL % (test code = EO%)  %           0.0-5.0                

    

 

             BASOPHIL % (test code = BA%)  %           0.0-1.0                  

  

 

             NEUTROPHIL # (test code = NT#)  K/mm3       1.8-7.7                

    

 

             LYMPHOCYTE # (test code = LY#)  K/mm3       1.0-5.0                

    

 

             MONOCYTE # (test code = MO#)  K/mm3       0-0.8                    

  

 

             EOSINOPHIL # (test code = EO#)  K/mm3       0.0-0.5                

    

 

             BASOPHIL # (test code = BA#)  K/mm3       0.0-0.2                  

  





[id number] 21:04:00* 



             Test Item    Value        Reference Range Interpretation Comments

 

             GLUBED (test code = GLUBED) 244 mg/dL           H            

Performed by certified  

at [facility]





- US ABDOMEN [id number] 20:08:00  Name: CHARLEE OSUNA             
 [facility]                     : 1965 Age/S: 54  / F         [address] 
[facility]                Unit #: [id number]     Loc:               [place]  
[place]  [address]              Phys: Patricia Peña MD                                 
                Acct: [id number]  Dis Date:               Status: ADM IN      
                           PHONE #: [phone]     Exam Date: 2019
                    FAX #: [phone]      Reason: elevated LFTs              
                        EXAMS:                                               CPT
CODE:      [id number] US ABDOMEN COMPLETE                        [address]           
        HISTORY: Elevated liver function tests.               COMPARISON: CT 
chest from 2019.               The liver is diffusely hyperechogenic 
consistent with severe diffuse       fibrofatty infiltration which limited 
evaluation for intrahepatic mass       however no discrete lesions.  The liver 
measured 18.1 cm in length.               No intra or extrahepatic biliary 
ductal dilatation.  CBD is normal at       2.4 mm.  Main portal vein is patent 
with hepatopedal flow and normal       spectral waveform.               Well-
distended gallbladder with large mobile gallstone within the       gallbladder 
measuring 3.7 cm.  No pericholecystic fluid or wall       thickening.  No 
ascites.               Both kidneys are free from hydronephrosis and normal 
echogenicity and       texture.  Right kidney measured 11.4 cm in length.  Left 
kidney       measured 11.3 cm in length with 1.5 cm left lower pole calyceal 
stone.        Calyceal stone is not visible on the right side which is visible 
on       the CT scan.               Spleen is measuring 12.2 cm in length and is
not enlarged.  Visualized       portions of the IVC, aorta and pancreas are 
normal however imaged       incompletely.                 IMPRESSION:           
       Large mobile gallstone without pericholecystic fluid or wall         
thickening.  No ascites.  Fibrofatty infiltrated liver.          Nonobstructing 
1.5 cm left lower pole calyceal stone.  No         hydronephrosis on either 
side.  Unremarkable spleen.          ** Electronically Signed by VINCENT kim on 2019 at  **                      Reported and signed by: Elinor Ashraf M.D.        CC: Patricia Peña MD                                    
                                                                                
Technologist: Jose Manuel Da Silva                                       [facility] Da
te/Time: 2019 () t.SDR.TH4                        Orig Print D/T: S: 0
2019 ()     Probe:                       PAGE  1                       
Signed Report                               [id number] 16:44:00* 



             Test Item    Value        Reference Range Interpretation Comments

 

             GLUBED (test code = GLUBED) 211 mg/dL           H            

Performed by certified  

at [facility]





[id number] 12:49:00* 



             Test Item    Value        Reference Range Interpretation Comments

 

             GLUBED (test code = GLUBED) 189 mg/dL           H            

Performed by certified  

at [facility]





COMPREHENSIVE METABOLIC [id number] 09:42:00* 



             Test Item    Value        Reference Range Interpretation Comments

 

             SODIUM (test code = NA) 140 mmol/L   136-145                   RESU

LT VERIFIED BY REPEAT ANALYSIS

 

             POTASSIUM (test code = K) 3.1 mmol/L   3.5-5.1      L             

 

             CHLORIDE (test code = CL) 106.0 mmol/L        N             

 

             CARBON DIOXIDE (test code = CO2) 28.0 mmol/L  21-32        N       

      

 

             ANION GAP (test code = GAP) 9.1          10-20        L            

 

 

             GLUCOSE (test code = GLU) 271 mg/dL           H             

 

             BLOOD UREA NITROGEN (test code = BUN) 6 mg/dL      7-18         L  

           

 

             GLOMERULAR FILTRATION RATE (test code = GFR) > 60 mL/min  >=60     

                 Estimated GFR by

using Modified MDRD formula.Chronic kidney disease is defined as either kidney 
damageor GFR <60 mL/min/1.73 m2 for >3 months.

 

             CREATININE (test code = CREAT) 0.70 mg/dL   0.55-1.02    N         

   **Note change in reference

range due to change in reagent.**

 

             BUN/CREATININE RATIO (test code = BUN/CREA) 8.6          10-20     

   L             

 

             TOTAL PROTEIN (test code = PROT) 6.6 gram/dL  6.4-8.2      N       

      

 

             ALBUMIN (test code = ALB) 2.6 g/dL     3.4-5.0      L             

 

             GLOBULIN (test code = GLOB) 4.0 gram/dL  2.7-4.2      N            

 

 

             ALBUMIN/GLOBULIN RATIO (test code = A/G) 0.7          0.75-1.50    

L             

 

             CALCIUM (test code = CA) 8.0 mg/dL    8.5-10.1     L             

 

             BILIRUBIN TOTAL (test code = BILT) 0.60 mg/dL   0.0-1.0      N     

        

 

             SGOT/AST (test code = AST) 67 IUnit/L   15-37        H             

 

             SGPT/ALT (test code = ALT) 91 IUnit/L   12-78        H             

 

             ALKALINE PHOSPHATASE TOTAL (test code = ALKP) 113 IUnit/L    

     N            **Note change

in reference range due to change in reagent.**





LIPID PROFILE (CORONARY RISK)2019 09:42:00* 



             Test Item    Value        Reference Range Interpretation Comments

 

             TRIGLYCERIDES (test code = TRIG) 176 mg/dL           H       

      

 

             CHOLESTEROL (test code = CHOL) 146 mg/dL    0-200        N         

    

 

             CHOLESTEROL/HDL RATIO (test code = CHOLHDL) 2.0 RATIO    0-4.9     

   N            RISK ASSOCIATED 

WITH CHOL/HDL RATIOS:     Risk          Male       Female1/2 AVERAGE        3.43
       3.27AVERAGE            4.97        4.442X AVERAGE         9.55        
7.053X AVERAGE         23.39      11.04 REFERENCE VALUE IS RELATED TO RISK 
LEVELS ASRECOMMENDED BY THE [facility]. HEART, LUNG, AND BLOOD INST.

 

             HDL CHOLESTEROL (test code = HDL) 50 mg/dL     40-60        N      

       

 

             LIPOPROTEIN LDL (test code = LDL) 68 mg/dL     100-129      L      

      

===========================================================Reference Interval:  
       mg/dL          
mmol/L-----------------------------------------------------------Optimal        
             <100           <2.6Near/above optimal          100-129        2.6-
3.3Borderline High             130-159        3.4-4.1High                       
160-189        4.1-4.9Very High                    &gt;=190          
>=4.9========= This LDL result is a direct measurement.=========





THYROID STIMULATING [id number] 09:42:00* 



             Test Item    Value        Reference Range Interpretation Comments

 

             THYROID STIMULATING HORMONE (test code = TSH) 1.160 uIU/mL 0.36-3.7

4    N            TSH 

REFERENCE RANGES:  EUTHYROID:     0.35 - 4.3 mIU/mL                       HYPO  
  :     > 5.5      mIU/mL                       HYPER    :     < 0.35     mIU/mL





COMPREHENSIVE METABOLIC [id number] 09:27:00* 



             Test Item    Value        Reference Range Interpretation Comments

 

             SODIUM (test code = NA) 140 mmol/L   136-145                   RESU

LT VERIFIED BY REPEAT ANALYSIS

 

             POTASSIUM (test code = K) 3.1 mmol/L   3.5-5.1      L             

 

             CHLORIDE (test code = CL) 106.0 mmol/L        N             

 

             CARBON DIOXIDE (test code = CO2)  mmol/L      21-32                

      

 

             ANION GAP (test code = GAP)              10-20                     

 

 

             GLUCOSE (test code = GLU)  mg/dL                            

 

             BLOOD UREA NITROGEN (test code = BUN)  mg/dL       7-18            

           

 

             GLOMERULAR FILTRATION RATE (test code = GFR)  mL/min      >=60     

                  

 

             CREATININE (test code = CREAT)  mg/dL       0.55-1.02              

    

 

             BUN/CREATININE RATIO (test code = BUN/CREA)              10-20     

                 

 

             TOTAL PROTEIN (test code = PROT)  gram/dL     6.4-8.2              

      

 

             ALBUMIN (test code = ALB)  g/dL        3.4-5.0                    

 

             GLOBULIN (test code = GLOB)  gram/dL     2.7-4.2                   

 

 

             ALBUMIN/GLOBULIN RATIO (test code = A/G)              0.75-1.50    

              

 

             CALCIUM (test code = CA)  mg/dL       8.5-10.1                   

 

             BILIRUBIN TOTAL (test code = BILT)  mg/dL       0.0-1.0            

        

 

             SGOT/AST (test code = AST)  IUnit/L     15-37                      

 

             SGPT/ALT (test code = ALT)  IUnit/L     12-78                      

 

             ALKALINE PHOSPHATASE TOTAL (test code = ALKP)  IUnit/L       

                   





LIPID PROFILE (CORONARY RISK)2019 09:27:00* 



             Test Item    Value        Reference Range Interpretation Comments

 

             TRIGLYCERIDES (test code = TRIG)  mg/dL                      

      

 

             CHOLESTEROL (test code = CHOL)  mg/dL       0-200                  

    

 

             CHOLESTEROL/HDL RATIO (test code = CHOLHDL)  RATIO       0-4.9     

                 

 

             HDL CHOLESTEROL (test code = HDL)  mg/dL       40-60               

       

 

             LIPOPROTEIN LDL (test code = LDL)  mg/dL       100-129             

       





THYROID STIMULATING [id number] 09:27:00* 



             Test Item    Value        Reference Range Interpretation Comments

 

             THYROID STIMULATING HORMONE (test code = TSH)  uIU/mL      0.36-3.7

4                  





[id number] 08:57:00* 



             Test Item    Value        Reference Range Interpretation Comments

 

             GLYCOSYLATED HEMOGLOBIN (HA1C) (test code = GLYHGB) 11.9 % HbA1  4.

8-6.0      H             

 

             ESTIMATED AVERAGE GLUCOSE (test code = EAG) 295 MG/DL              

                 





CBC W/AUTO [id number] 08:47:00* 



             Test Item    Value        Reference Range Interpretation Comments

 

             WHITE BLOOD CELL (test code = WBC) 8.6 K/mm3    4.5-12.5     N     

        

 

             RED BLOOD CELL (test code = RBC) 4.75 mill/mm3 3.7-5.2      N      

       

 

             HEMOGLOBIN (test code = HGB) 13.7 gram/dL 11.5-15.5    N           

  

 

             HEMATOCRIT (test code = HCT) 41.0 %       36.0-46.0    N           

  

 

             MEAN CELL VOLUME (test code = MCV) 86.3 fL      80-98        N     

        

 

             MEAN CELL HGB (test code = MCH) 28.8 picogram 27.0-33.0    N       

      

 

             MEAN CELL HGB CONCETRATION (test code = MCHC) 33.4 gram/dL 33.0-36.

0    N             

 

             RED CELL DISTRIBUTION WIDTH (test code = RDW) 12.5 %       11.6-16.

2    N             

 

             RED CELL DISTRIBUTION WIDTH SD (test code = RDW-SD) 39.4 fL      37

.0-51.0    N             

 

             PLATELET COUNT (test code = PLT) 177 K/mm3    150-450      N       

      

 

             MEAN PLATELET VOLUME (test code = MPV) 10.9 fL      6.7-11.0     N 

            

 

             NEUTROPHIL % (test code = NT%) 60.5 %       39.0-69.0    N         

    

 

             IMMATURE GRANULOCYTE % (test code = IG%) 0.5 %        0.0-5.0      

N             

 

             LYMPHOCYTE % (test code = LY%) 30.7 %       25.0-55.0    N         

    

 

             MONOCYTE % (test code = MO%) 6.6 %        0.0-10.0     N           

  

 

             EOSINOPHIL % (test code = EO%) 1.5 %        0.0-5.0      N         

    

 

             BASOPHIL % (test code = BA%) 0.2 %        0.0-1.0      N           

  

 

             NUCLEATED RBC % (test code = NRBC%) 0.0 %        0-0          N    

         

 

             NEUTROPHIL # (test code = NT#) 5.19 K/mm3   1.8-7.7      N         

    

 

             IMMATURE GRANULOCYTE # (test code = IG#) 0.04 x10 3/uL 0-0.03      

 H             

 

             LYMPHOCYTE # (test code = LY#) 2.63 K/mm3   1.0-5.0      N         

    

 

             MONOCYTE # (test code = MO#) 0.57 K/mm3   0-0.8        N           

  

 

             EOSINOPHIL # (test code = EO#) 0.13 K/mm3   0.0-0.5      N         

    

 

             BASOPHIL # (test code = BA#) 0.02 K/mm3   0.0-0.2      N           

  

 

             NUCLEATED RBC # (test code = NRBC#) 0.00 K/mm3   0.0-0.1      N    

         

 

             MANUAL DIFF REQUIRED (test code = MDIFF) NO                        

              





CBC W/AUTO [id number] 08:43:00* 



             Test Item    Value        Reference Range Interpretation Comments

 

             WHITE BLOOD CELL (test code = WBC)  K/mm3       4.5-12.5           

        

 

             RED BLOOD CELL (test code = RBC)  mill/mm3    3.7-5.2              

      

 

             HEMOGLOBIN (test code = HGB) 13.7 gram/dL 11.5-15.5    N           

  

 

             HEMATOCRIT (test code = HCT) 41.0 %       36.0-46.0    N           

  

 

             MEAN CELL VOLUME (test code = MCV)  fL          80-98              

        

 

             MEAN CELL HGB (test code = MCH)  picogram    27.0-33.0             

     

 

             MEAN CELL HGB CONCETRATION (test code = MCHC)  gram/dL     33.0-36.

0                  

 

             RED CELL DISTRIBUTION WIDTH (test code = RDW)  %           11.6-16.

2                  

 

             RED CELL DISTRIBUTION WIDTH SD (test code = RDW-SD)  fL          37

.0-51.0                  

 

             PLATELET COUNT (test code = PLT)  K/mm3       150-450              

      

 

             MEAN PLATELET VOLUME (test code = MPV)  fL          6.7-11.0       

            

 

             NEUTROPHIL % (test code = NT%)  %           39.0-69.0              

    

 

             IMMATURE GRANULOCYTE % (test code = IG%)  %           0.0-5.0      

              

 

             LYMPHOCYTE % (test code = LY%)  %           25.0-55.0              

    

 

             MONOCYTE % (test code = MO%)  %           0.0-10.0                 

  

 

             EOSINOPHIL % (test code = EO%)  %           0.0-5.0                

    

 

             BASOPHIL % (test code = BA%)  %           0.0-1.0                  

  

 

             NEUTROPHIL # (test code = NT#)  K/mm3       1.8-7.7                

    

 

             LYMPHOCYTE # (test code = LY#)  K/mm3       1.0-5.0                

    

 

             MONOCYTE # (test code = MO#)  K/mm3       0-0.8                    

  

 

             EOSINOPHIL # (test code = EO#)  K/mm3       0.0-0.5                

    

 

             BASOPHIL # (test code = BA#)  K/mm3       0.0-0.2                  

  





[id number] 07:02:00* 



             Test Item    Value        Reference Range Interpretation Comments

 

             GLUBED (test code = GLUBED) 253 mg/dL           H            

Performed by certified  

at [facility]





[id number] 00:45:00* 



             Test Item    Value        Reference Range Interpretation Comments

 

             TROPONIN-I (test code = TROPI) <0.015 ng/mL 0-0.045      N         

    





COMMENTS TO PHLEBOTOMIST: COLLECT 3 HOURS AFTER PREVIOUS                        
  [id number] 21:27:00* 



             Test Item    Value        Reference Range Interpretation Comments

 

             GLUBED (test code = GLUBED) 155 mg/dL           H            

Performed by certified  

at [facility]





[id number] 20:25:00* 



             Test Item    Value        Reference Range Interpretation Comments

 

             TROPONIN-I (test code = TROPI) <0.015 ng/mL 0-0.045      N         

    





COMMENTS TO PHLEBOTOMIST: COLLECT 3 HOURS AFTER PREVIOUS                        
  SAMPLE- CT NECK W/O [id number] 20:18:00  Name: CHARLEE OSUNA    
          [facility]                     : 1965 Age/S: 54  / F     
   [address] [facility]                Unit #: [id number]     Loc:               
[place]  [address]              Phys: Patricia Peña MD                      
                           Acct: [id number]  Dis Date:               Status: 
ADM IN                                  PHONE #: [phone]     Exam Date: 
2019                     FAX #: [phone]      Reason: Pain neck 
,edema                                    EXAMS:                                
              CPT CODE:      [id number] CT NECK W/O CONTRAST                     
 [address]                    HISTORY: Neck pain and edema.               
COMPARISON: CT neck from 2019.               CT neck with contrast: 
Exposure control.               Visualized brain parenchyma is within normal 
limits.  Visualized       paranasal sinuses are clear.  Mastoid air cells 
demonstrating partial       opacification on the left side with sclerosis 
suggesting chronic       mastoiditis.  Symmetrical fossa of Rosenmueller and p
arapharyngeal       spaces.  The tonsils are not enlarged.  No fluid collection.
              Hard and soft palate are extensively obscured but artifact from   
   patient's dental.  Base of the are unremarkable.  Symmetrical parotid       
and submandibular glands.  No sialoliths are visible.  Shotty       adenopathy 
from level 1 through 5. Epiglottis and aryepiglottic folds.        Vallecula and
piriform sinuses are unremarkable.               True and false cords are sybil
l.  Trachea is unremarkable.  Thyroid       glands demonstrate mild heterogeneit
y especially on the left side.        Superior mediastinum is unremarkable.  Allie
g apices are clear.  No       lytic or blastic lesions are noted within the bony
skeleton.  Marker       noted along the submental location without discrete mas
s.  No       inflammatory change.  Mild skin thickening is nonspecific and could
      represent cellulitis.  No drainable fluid collection.                 IMP
RESSION:                   Widely patent airway.  No pathologic adenopathy from 
level 1 through         6.  Nonspecific skin thickening within submental locatio
n at the site         of marker could suggest cellulitis but no drainable fluid 
or         inflammatory change.  No pathologic adenopathy.  Widely patent airway
.                    ** Electronically Signed by VINCENT Ashraf on 2019
at 2018 **                      Reported and signed by: Laci Ashraf M.D.      
CC: Patricia Peña MD                                                          
                                                           Technologist:Susy Marin RT(R)            CTDI:        DLP:        Trnscb Date/Time: 2019 
(2018) t.SDR.TH4                        Orig Print D/T: S: 2019 (0010)    
 PAGE  1                       Signed Report                               
[id number] 18:58:00* 



             Test Item    Value        Reference Range Interpretation Comments

 

             GLUBED (test code = GLUBED) 299 mg/dL           H            

Performed by certified  

at [facility]





[id number] 18:58:00* 



             Test Item    Value        Reference Range Interpretation Comments

 

             GLUBED (test code = GLUBED) > 500 mg/dL         HH           

Performed by certified 

 at [facility]





ARTERIAL BLOOD [id number] 15:42:00* 



             Test Item    Value        Reference Range Interpretation Comments

 

             ARTERIAL BLOOD GAS PH (test code = PHA) 7.44         7.35-7.45    N

             

 

             ARTERIAL BLOOD GAS PCO2 (test code = PCO2A) 35.2 mm Hg   35-45     

   N             

 

             ARTERIAL BLOOD GAS PO2 (test code = PO2A) 76.2 mmHg          

 L             

 

             BICARBONATE TOTAL HCO3 (test code = HCO3) 23.4 mmol/L  23.0-27.0   

 N             

 

             BASE EXCESS (test code = RAYRAY) -0.2 mmol/L  -3.0-5.0     N          

   

 

             ABG O2 SATURATION (test code = SATA) 96.1 %       90.0-98.0    N   

          

 

             ABG TYPE (test code = TYPEA) Arterial                              

  

 

             FIO2 (test code = FIO2A) 21.0                                    

 

             ABG SITE (test code = SITEA) Lt RADIAL ARTERY                      

      

 

             MODIFIED ALLENS (test code = MODALL) Yes CHECK    PERFORMED        

          

 

             HEMATOCRIT (test code = HCT/ABG) 46 %         35-47        N       

      

 

             TOTAL HGB (test code = THB) 15.8 gram/dL 11.5-15.5    H            

 

 

             HGB O2 SAT (test code = HBOSAT) 94.8 %       94.00-98.00  N        

     

 

             CARBOXYHEMOGLOBIN (test code = HOHGBT) 1.1 %totalHg 0.5-1.5      N 

            

 

             METHEMOGLOBIN (test code = METHGB) 0.3 %        0.0-1.50     N     

        

 

             O2 CONTENT (test code = O2CT) 21.1 % vol   18.0-22.0    N          

   





HEPATIC FUNCTION [id number] 15:35:00* 



             Test Item    Value        Reference Range Interpretation Comments

 

             TOTAL PROTEIN (test code = PROT) 7.2 gram/dL  6.4-8.2      N       

      

 

             ALBUMIN (test code = ALB) 3.1 g/dL     3.4-5.0      L             

 

             GLOBULIN (test code = GLOB) 4.1 gram/dL  2.7-4.2      N            

 

 

             ALBUMIN/GLOBULIN RATIO (test code = A/G) 0.8          0.75-1.50    

N             

 

             BILIRUBIN TOTAL (test code = BILT) 0.50 mg/dL   0.0-1.0      N     

        

 

             BILIRUBIN DIRECT (test code = BILD) 0.18 mg/dL   0.0-0.20     N    

         

 

             SGOT/AST (test code = AST) 69 IUnit/L   15-37        H             

 

             SGPT/ALT (test code = ALT) 114 IUnit/L  12-78        H             

 

             ALKALINE PHOSPHATASE TOTAL (test code = ALKP) 144 IUnit/L    

     H            **Note change

in reference range due to change in reagent.**





[id number] 15:35:00* 



             Test Item    Value        Reference Range Interpretation Comments

 

             LIPASE (test code = LIP) 104 U/L      73.0-393.0   N             





URINALYSIS [id number] 15:24:00* 



             Test Item    Value        Reference Range Interpretation Comments

 

             UA COLOR (test code = COLU) COLORLESS    YELLOW       A            

 

 

             UA APPEARANCE (test code = APPU) CLEAR        CLEAR                

      

 

             UA GLUCOSE DIPSTICK (test code = DGLUU) >1000 (4+) mg/dL NEGATIVE  

                 

 

             UA BILIRUBIN DIPSTICK (test code = BILU) NEGATIVE mg/dL NEGATIVE   

                

 

             UA KETONE DIPSTICK (test code = KETU) NEGATIVE mg/dL NEGATIVE      

             

 

             UA SPECIFIC GRAVITY (test code = SGU) 1.030        1.001-1.035     

           

 

             UA BLOOD DIPSTICK (test code = JEN) Negative mg/dL NEGATIVE        

           

 

             UA PH DIPSTICK (test code = BEN) 6.0          5.0-8.0              

      

 

             UA PROTEIN DIPSTICK (test code = PROU) NEGATIVE mg/dL NEGATIVE     

              

 

             UA UROBILINIOGEN DIPSTICK (test code = URO) Normal mg/dL NEGATIVE  

                 

 

             UA NITRITE DIPSTICK (test code = FOZIA) NEGATIVE     NEGATIVE       

            

 

             UA LEUKOCYTE ESTERASE W REFLEX (test code = LEUUR) NEGATIVE Rosanna/uL 

NEGATIVE                   

 

             UA WBC (test code = WBCU) 11-20 per HPF 0-5          A             

 

             UA RBC (test code = RBCU) 0-2 #/HPF    0-5                        

 

             UA EPITHELIAL CELLS (test code = EPIU) FEW per HPF  FEW            

            

 

             UA BACTERIA (test code = BACU) NONE SEEN #/HPF NONE                

       





Urine Source? Clean CatchBASIC METABOLIC [id number] 15:20:00* 



             Test Item    Value        Reference Range Interpretation Comments

 

             SODIUM (test code = NA) 129 mmol/L   136-145      L             

 

             POTASSIUM (test code = K) 3.5 mmol/L   3.5-5.1      N             

 

             CHLORIDE (test code = CL) 95.0 mmol/L         L             

 

             CARBON DIOXIDE (test code = CO2) 22.0 mmol/L  21-32        N       

      

 

             ANION GAP (test code = GAP) 15.5         10-20        N            

 

 

             GLUCOSE (test code = GLU) 766 mg/dL                      Vivi ribeiro called to DR LINDSAY by 

V.LAB.LT 19 1520Critical results verified and read back by Nurse? Y

 

             BLOOD UREA NITROGEN (test code = BUN) 7 mg/dL      7-18         N  

           

 

             GLOMERULAR FILTRATION RATE (test code = GFR) 39 mL/min    >=60     

                 Estimated GFR by 

using Modified MDRD formula.Chronic kidney disease is defined as either kidney 
damageor GFR <60 mL/min/1.73 m2 for >3 months.

 

             CREATININE (test code = CREAT) 1.40 mg/dL   0.55-1.02    H         

   **Note change in reference

range due to change in reagent.**

 

             BUN/CREATININE RATIO (test code = BUN/CREA) 5.0          10-20     

   L             

 

             CALCIUM (test code = CA) 8.7 mg/dL    8.5-10.1     N             





[id number] 15:20:00* 



             Test Item    Value        Reference Range Interpretation Comments

 

             TROPONIN-I (test code = TROPI) <0.015 ng/mL 0-0.045      N         

    





BASIC METABOLIC [id number] 14:37:00* 



             Test Item    Value        Reference Range Interpretation Comments

 

             SODIUM (test code = NA) 129 mmol/L   136-145      L             

 

             POTASSIUM (test code = K) 3.5 mmol/L   3.5-5.1      N             

 

             CHLORIDE (test code = CL) 95.0 mmol/L         L             

 

             CARBON DIOXIDE (test code = CO2)  mmol/L      21-32                

      

 

             ANION GAP (test code = GAP)              10-20                     

 

 

             GLUCOSE (test code = GLU)  mg/dL                            

 

             BLOOD UREA NITROGEN (test code = BUN)  mg/dL       7-18            

           

 

             GLOMERULAR FILTRATION RATE (test code = GFR)  mL/min      >=60     

                  

 

             CREATININE (test code = CREAT)  mg/dL       0.55-1.02              

    

 

             BUN/CREATININE RATIO (test code = BUN/CREA)              10-20     

                 

 

             CALCIUM (test code = CA)  mg/dL       8.5-10.1                   





[id number]--27-03 14:37:00* 



             Test Item    Value        Reference Range Interpretation Comments

 

             TROPONIN-I (test code = TROPI)  ng/mL       0-0.045                

    





CBC W/O [id number] 14:25:00* 



             Test Item    Value        Reference Range Interpretation Comments

 

             WHITE BLOOD CELL (test code = WBC) 8.4 K/mm3    4.5-12.5     N     

        

 

             RED BLOOD CELL (test code = RBC) 4.87 mill/mm3 3.7-5.2      N      

       

 

             HEMOGLOBIN (test code = HGB) 14.0 gram/dL 11.5-15.5    N           

  

 

             HEMATOCRIT (test code = HCT) 42.4 %       36.0-46.0    N           

  

 

             MEAN CELL VOLUME (test code = MCV) 87.1 fL      80-98        N     

        

 

             MEAN CELL HGB (test code = MCH) 28.7 picogram 27.0-33.0    N       

      

 

             MEAN CELL HGB CONCETRATION (test code = MCHC) 33.0 gram/dL 33.0-36.

0    N             

 

             RED CELL DISTRIBUTION WIDTH (test code = RDW) 12.4 %       11.6-16.

2    N             

 

             PLATELET COUNT (test code = PLT) 179 K/mm3    150-450      N       

      

 

             MEAN PLATELET VOLUME (test code = MPV) 10.9 fL      6.7-11.0     N 

            





CBC W/O [id number] 14:23:00* 



             Test Item    Value        Reference Range Interpretation Comments

 

             WHITE BLOOD CELL (test code = WBC)  K/mm3       4.5-12.5           

        

 

             RED BLOOD CELL (test code = RBC)  mill/mm3    3.7-5.2              

      

 

             HEMOGLOBIN (test code = HGB) 14.0 gram/dL 11.5-15.5    N           

  

 

             HEMATOCRIT (test code = HCT) 42.4 %       36.0-46.0    N           

  

 

             MEAN CELL VOLUME (test code = MCV)  fL          80-98              

        

 

             MEAN CELL HGB (test code = MCH)  picogram    27.0-33.0             

     

 

             MEAN CELL HGB CONCETRATION (test code = MCHC)  gram/dL     33.0-36.

0                  

 

             RED CELL DISTRIBUTION WIDTH (test code = RDW)  %           11.6-16.

2                  

 

             PLATELET COUNT (test code = PLT)  K/mm3       150-450              

      

 

             MEAN PLATELET VOLUME (test code = MPV)  fL          6.7-11.0       

            





- XR CHEST 1 -89-86 14:13:00 FAX:         Sergio Lindsay DO                 
   [place]: B   St: REG----------
---------------------------------------------------------------------  Name:   CHARLEE LUO              [facility]                     : [birth year]  Age/S: 54/F           [address] [facility]                Unit #: [id number]    
 Loc: [place]  [address]              Phys: Sergio Lindsay DO     
                                              Acct: [id number] Dis Date:      
        Status: REG ER                                 PHONE #: [phone]    
Exam Date:     2019     1346                   FAX #: [phone]     
Reason: CHEST PAIN                                         EXAMS:               
                               CPT CODE:      [id number] XR CHEST 1 V            
                  [id number]                            REASON FOR EXAM: CHEST PAIN  
            Exam Order Date: 9/3/2019 1:27 PM               Ordering M.D.: Segrio Linsday DO               PROCEDURE:  - XR CHEST 1 V               COMPARISON: 
CT chest 2019               FINDINGS:         The lungs are clear o
ther than mild subsegmental atelectasis in the       left midlung.  There is no 
pleural effusion or pneumothorax. Pulmonary       vascularity is within normal l
imits.               Cardiomediastinal silhouette is normal in size for techniqu
e. The       mediastinal contours are within normal limits.               Degene
rative changes are present in the spine and right       acromioclavicular joint.
              The visualized upper abdomen is within normal limits.             
           IMPRESSION:         No acute cardiopulmonary process.          ** E
lectronically Signed by Stephen Jauregui MD on 2019 at 1413 **                 
    Reported and signed by: Stephne Jauregui MD             CC: Sergio Lindsay DO     
                                                                                
                                 Technologist: Brooklyn Ramírez RT(R)             
                     Trnscrd Date/Time/By: 2019 (9612) : By: [email or web address]   
      Orig Print D/T: S: 2019 (3562)                         PAGE  1      
                Signed Report                               [id number] 
10:56:00* 



             Test Item    Value        Reference Range Interpretation Comments

 

             GLUBED (test code = GLUBED) 146 mg/dL           H            

Performed by certified  

at [facility]





[id number] 07:45:00* 



             Test Item    Value        Reference Range Interpretation Comments

 

             GLUBED (test code = GLUBED) 97 mg/dL            N            

Performed by certified  at

[facility]





[id number] 21:19:00* 



             Test Item    Value        Reference Range Interpretation Comments

 

             GLUBED (test code = GLUBED) 173 mg/dL           H            

Performed by certified  

at [facility]





[id number] 20:59:00* 



             Test Item    Value        Reference Range Interpretation Comments

 

             GLUBED (test code = GLUBED) 142 mg/dL           H            

Performed by certified  

at [facility]





- MRI BRAIN W/O [id number] 16:55:00 FAX: Patricia Abrams MD   
[phone]    [place]: B   St: [facility] FAX:         Chary Lozada             
    ----------------------------------------
---------------------------------------  Name:   CHARLEE OSUNA            
 [facility]                     : 1965  Age/S: 53/F           [address] 
[facility]                Unit #: [id number]      Loc: [address]       [place]  [address]              Phys: Chary Lozada NP                             
                Acct: [id number] Dis Date:               Status: ADM IN       
                         PHONE #: [phone]     Exam Date:     2019    
1645                   FAX #: [phone]     Reason: headache                 
                         EXAMS:                                               
CPT CODE:      [id number] MRI BRAIN W/O CONTRAST                     [id number]       
            REASON FOR EXAM: headache               Exam Order Date: 2019 
12:33 PM               Attending MMaiaDMaia: Chary O Lozada, NP               P
rocedure:  - MRI BRAIN W/O CONTRAST               Comparison:               FIND
INGS: Axial, sagittal, and coronal images of the head were       obtained using 
T1, T2 weighted, inversion recovery, and gradient echo       sequences.  The dif
fusion images are within normal limits without       abnormal signal intensity t
o suggest acute infarct. No IV gadolinium       was given.               The sag
ittal images show normal pituitary, cerebellum, and brain stem.       No evidenc
e of suprasellar mass.               The axial T2, inversion recovery, and gradi
ent echo images show no       evidence of intra or extra axial mass. The ventric
les, cisterns, and       sulci are unremarkable. No evidence of hemorrhage.     
         The cerebellar pontine angle area is within normal limits. There is no 
     evidence of mass noted.               The axial T1 images show no evidence 
of mass.               The coronal images show normal optic chiasm.             
   IMPRESSION: Chronic left basal ganglia infarct.  No acute findings          
** Electronically Signed by VINCENT Zavala on 2019 at 7307 **               
      Reported and signed by: Italo Zavala M.D.        CC: Patricia Peña MD; Co
Chary madrigal NP                                                            
                                 Technologist: Alexandra GRANT(ARTHUR)(MR)       
                     Trnscrd Date/Time/By: 2019 (5606) : By: [email or web address]    
      Orig Print D/T: S: 2019 (9973)                         PAGE  1      
                Signed Report                               [id number] 
11:18:00* 



             Test Item    Value        Reference Range Interpretation Comments

 

             GLUBED (test code = GLUBED) 130 mg/dL           H            

Performed by certified  

at [facility]





CBC W/AUTO [id number] 08:11:00* 



             Test Item    Value        Reference Range Interpretation Comments

 

             WHITE BLOOD CELL (test code = WBC) 12.7 K/mm3   4.5-12.5     H     

        

 

             RED BLOOD CELL (test code = RBC) 4.99 mill/mm3 3.7-5.2      N      

       

 

             HEMOGLOBIN (test code = HGB) 13.7 gram/dL 11.5-15.5    N           

  

 

             HEMATOCRIT (test code = HCT) 44.1 %       36.0-46.0    N           

  

 

             MEAN CELL VOLUME (test code = MCV) 88.4 fL      80-98        N     

        

 

             MEAN CELL HGB (test code = MCH) 27.5 picogram 27.0-33.0    N       

      

 

             MEAN CELL HGB CONCETRATION (test code = MCHC) 31.1 gram/dL 33.0-36.

0    L             

 

             RED CELL DISTRIBUTION WIDTH (test code = RDW) 13.2 %       11.6-16.

2    N             

 

             RED CELL DISTRIBUTION WIDTH SD (test code = RDW-SD) 42.6 fL      37

.0-51.0    N             

 

             PLATELET COUNT (test code = PLT) 252 K/mm3    150-450      N       

      

 

             MEAN PLATELET VOLUME (test code = MPV) 10.6 fL      6.7-11.0     N 

            

 

             NEUTROPHIL % (test code = NT%) 64.1 %       39.0-69.0    N         

    

 

             IMMATURE GRANULOCYTE % (test code = IG%) 0.4 %        0.0-5.0      

N             

 

             LYMPHOCYTE % (test code = LY%) 28.9 %       25.0-55.0    N         

    

 

             MONOCYTE % (test code = MO%) 5.7 %        0.0-10.0     N           

  

 

             EOSINOPHIL % (test code = EO%) 0.6 %        0.0-5.0      N         

    

 

             BASOPHIL % (test code = BA%) 0.3 %        0.0-1.0      N           

  

 

             NUCLEATED RBC % (test code = NRBC%) 0.0 %        0-0          N    

         

 

             NEUTROPHIL # (test code = NT#) 8.13 K/mm3   1.8-7.7      H         

    

 

             IMMATURE GRANULOCYTE # (test code = IG#) 0.05 x10 3/uL 0-0.03      

 H             

 

             LYMPHOCYTE # (test code = LY#) 3.66 K/mm3   1.0-5.0      N         

    

 

             MONOCYTE # (test code = MO#) 0.72 K/mm3   0-0.8        N           

  

 

             EOSINOPHIL # (test code = EO#) 0.07 K/mm3   0.0-0.5      N         

    

 

             BASOPHIL # (test code = BA#) 0.04 K/mm3   0.0-0.2      N           

  

 

             NUCLEATED RBC # (test code = NRBC#) 0.00 K/mm3   0.0-0.1      N    

         

 

             MANUAL DIFF REQUIRED (test code = MDIFF) NO                        

              





COMPREHENSIVE METABOLIC [id number] 07:38:00* 



             Test Item    Value        Reference Range Interpretation Comments

 

             SODIUM (test code = NA) 140 mmol/L   136-145      N             

 

             POTASSIUM (test code = K) 3.7 mmol/L   3.5-5.1      N             

 

             CHLORIDE (test code = CL) 102.0 mmol/L        N             

 

             CARBON DIOXIDE (test code = CO2) 28.0 mmol/L  21-32        N       

      

 

             ANION GAP (test code = GAP) 13.7         10-20        N            

 

 

             GLUCOSE (test code = GLU) 105 mg/dL           N             

 

             BLOOD UREA NITROGEN (test code = BUN) 20 mg/dL     7-18         H  

           

 

             GLOMERULAR FILTRATION RATE (test code = GFR) > 60 mL/min  >=60     

                 Estimated GFR by

using Modified MDRD formula.Chronic kidney disease is defined as either kidney 
damageor GFR <60 mL/min/1.73 m2 for >3 months.

 

             CREATININE (test code = CREAT) 0.80 mg/dL   0.55-1.02    N         

   **Note change in reference

range due to change in reagent.**

 

             BUN/CREATININE RATIO (test code = BUN/CREA) 25.0         10-20     

   H             

 

             TOTAL PROTEIN (test code = PROT) 7.2 gram/dL  6.4-8.2      N       

      

 

             ALBUMIN (test code = ALB) 2.9 g/dL     3.4-5.0      L             

 

             GLOBULIN (test code = GLOB) 4.3 gram/dL  2.7-4.2      H            

 

 

             ALBUMIN/GLOBULIN RATIO (test code = A/G) 0.7          0.75-1.50    

L             

 

             CALCIUM (test code = CA) 9.1 mg/dL    8.5-10.1     N             

 

             BILIRUBIN TOTAL (test code = BILT) 0.30 mg/dL   0.0-1.0      N     

        

 

             SGOT/AST (test code = AST) 24 IUnit/L   15-37        N             

 

             SGPT/ALT (test code = ALT) 61 IUnit/L   12-78        N             

 

             ALKALINE PHOSPHATASE TOTAL (test code = ALKP) 87 IUnit/L     

     N            **Note change 

in reference range due to change in reagent.**





[id number] 07:37:00* 



             Test Item    Value        Reference Range Interpretation Comments

 

             GLUBED (test code = GLUBED) 103 mg/dL           N            

Performed by certified  

at [facility]





COMPREHENSIVE METABOLIC [id number] 07:27:00* 



             Test Item    Value        Reference Range Interpretation Comments

 

             SODIUM (test code = NA) 140 mmol/L   136-145      N             

 

             POTASSIUM (test code = K) 3.7 mmol/L   3.5-5.1      N             

 

             CHLORIDE (test code = CL) 102.0 mmol/L        N             

 

             CARBON DIOXIDE (test code = CO2)  mmol/L      21-32                

      

 

             ANION GAP (test code = GAP)              10-20                     

 

 

             GLUCOSE (test code = GLU)  mg/dL                            

 

             BLOOD UREA NITROGEN (test code = BUN)  mg/dL       7-18            

           

 

             GLOMERULAR FILTRATION RATE (test code = GFR)  mL/min      >=60     

                  

 

             CREATININE (test code = CREAT)  mg/dL       0.55-1.02              

    

 

             BUN/CREATININE RATIO (test code = BUN/CREA)              10-20     

                 

 

             TOTAL PROTEIN (test code = PROT)  gram/dL     6.4-8.2              

      

 

             ALBUMIN (test code = ALB)  g/dL        3.4-5.0                    

 

             GLOBULIN (test code = GLOB)  gram/dL     2.7-4.2                   

 

 

             ALBUMIN/GLOBULIN RATIO (test code = A/G)              0.75-1.50    

              

 

             CALCIUM (test code = CA)  mg/dL       8.5-10.1                   

 

             BILIRUBIN TOTAL (test code = BILT)  mg/dL       0.0-1.0            

        

 

             SGOT/AST (test code = AST)  IUnit/L     15-37                      

 

             SGPT/ALT (test code = ALT)  IUnit/L     12-78                      

 

             ALKALINE PHOSPHATASE TOTAL (test code = ALKP)  IUnit/L       

                   





- CT NECK W/[id number] 23:27:00  Name: CHARLEE OSUNA              
[facility]                     : 1965 Age/S: 53  / F         [address] 
[facility]                Unit #: [id number]     Loc:               [place]  [address]              Phys: Concepcion Brown MD                                 
                Acct: [id number]  Dis Date:               Status: ADM IN      
                           PHONE #: [phone]     Exam Date: 2019  2310
                    FAX #: [phone]      Reason: neck pain; lymph node pain 
                        EXAMS:                                               CPT
CODE:      [id number] CT NECK W/CONTRAST                         [id number]           
        HISTORY:   Female, 53 years of age with neck pain; lymph node pain      
         Location code: R16               EXAM:  CT SCAN OF SOFT TISSUES OF THE 
NECK WITH IV CONTRAST.               COMPARISON: Correlation made with CT of 
chest with contrast performed       9 hours prior               TECHNIQUE:  
Helical axial images were obtained with nonionic IV       contrast using the 
soft tissue neck protocol. Coronal and sagittal       reformats were performed. 
One or more of the following dose reduction       techniques were used: 
Automated exposure control; adjustment of the mA       and/or kV according to 
the patient size; and/or use of iterative       reconstruction technique.       
       FINDINGS: No pathologically enlarged or necrotic lymph nodes are       
identified in the neck. No significant hypertrophy of tonsils or       adenoids.
 No peritonsillar abscess collection.  No prevertebral soft       tissue edema. 
Vocal cords are symmetric.  No airway compromise.  The       epiglottis is 
within normal limits.  Tongue base is unremarkable.        Carotid and jugular 
vessels are unremarkable. Salivary glands are       unremarkable. Thyroid is 
normal size but contains a 0.9 cm cyst in the       left lobe. Images through 
lung apices are unremarkable. Visualized       paranasal sinuses are clear. 
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 Right mastoid 
air cells are clear. No acute osseous       abnormalities.                 
IMPRESSION:  No significant cervical lymphadenopathy or other acute         
pathology in the neck.          ** Electronically Signed by Adrianna Munguia MD on 
2019 at 2327 **                      Reported and signed by: Adrianna Munguia MD         CC: Concepcion Brown MD; Patricia Peña MD                      
                                                                           Nicole
hnologist:ALDO ROBBINS RT; CHARAN LI  CTDI:        DLP:        Trnscb Date/T
fernando: 2019 () Donna                        Orig Print D/T: S:  (7760)       CTDI:          DLP:          PAGE  1                       Si
gned Report                               T4 [id number] 22:37:00* 



             Test Item    Value        Reference Range Interpretation Comments

 

             T4 FREE (test code = T4F) 1.13 ng/dL   0.76-1.46    N             





THYROID STIMULATING [id number] 22:37:00* 



             Test Item    Value        Reference Range Interpretation Comments

 

             THYROID STIMULATING HORMONE (test code = TSH) 1.030 uIU/mL 0.36-3.7

4    N            TSH 

REFERENCE RANGES:  EUTHYROID:     0.35 - 4.3 mIU/mL                       HYPO  
  :     > 5.5      mIU/mL                       HYPER    :     < 0.35     mIU/mL





[id number] 22:32:00* 



             Test Item    Value        Reference Range Interpretation Comments

 

             TROPONIN-I (test code = TROPI) <0.015 ng/mL 0-0.045      N         

    





B-TYPE NATRIURETIC [id number] 22:31:00* 



             Test Item    Value        Reference Range Interpretation Comments

 

             B-TYPE NATRIURETIC PEPTIDE (test code = BNP) 45.67 pgram/mL 0-100  

      N             





LIPID PROFILE (CORONARY RISK)2019 22:24:00* 



             Test Item    Value        Reference Range Interpretation Comments

 

             TRIGLYCERIDES (test code = TRIG) 85 mg/dL            N       

      

 

             CHOLESTEROL (test code = CHOL) 172 mg/dL    0-200        N         

    

 

             CHOLESTEROL/HDL RATIO (test code = CHOLHDL) 2.0 RATIO    0-4.9     

   N            RISK ASSOCIATED 

WITH CHOL/HDL RATIOS:     Risk          Male       Female1/2 AVERAGE        3.43
       3.27AVERAGE            4.97        4.442X AVERAGE         9.55        
7.053X AVERAGE         23.39      11.04 REFERENCE VALUE IS RELATED TO RISK 
LEVELS ASRECOMMENDED BY THE [facility]. HEART, LUNG, AND BLOOD INST.

 

             HDL CHOLESTEROL (test code = HDL) 78 mg/dL     40-60        H      

       

 

             LIPOPROTEIN LDL (test code = LDL) 73 mg/dL     100-129      L      

      

===========================================================Reference Interval:  
       mg/dL          
mmol/L-----------------------------------------------------------Optimal        
             <100           <2.6Near/above optimal          100-129        2.6-
3.3Borderline High             130-159        3.4-4.1High                       
160-189        4.1-4.9Very High                    &gt;=190          
>=4.9========= This LDL result is a direct measurement.=========





HEPATIC FUNCTION [id number] 22:24:00* 



             Test Item    Value        Reference Range Interpretation Comments

 

             TOTAL PROTEIN (test code = PROT) 6.7 gram/dL  6.4-8.2      N       

      

 

             ALBUMIN (test code = ALB) 3.0 g/dL     3.4-5.0      L             

 

             GLOBULIN (test code = GLOB) 3.7 gram/dL  2.7-4.2      N            

 

 

             ALBUMIN/GLOBULIN RATIO (test code = A/G) 0.8          0.75-1.50    

N             

 

             BILIRUBIN TOTAL (test code = BILT) 0.40 mg/dL   0.0-1.0      N     

        

 

             BILIRUBIN DIRECT (test code = BILD) 0.14 mg/dL   0.0-0.20     N    

         

 

             SGOT/AST (test code = AST) 27 IUnit/L   15-37        N             

 

             SGPT/ALT (test code = ALT) 63 IUnit/L   12-78        N             

 

             ALKALINE PHOSPHATASE TOTAL (test code = ALKP) 88 IUnit/L     

     N            **Note change 

in reference range due to change in reagent.**





[id number] 22:24:00* 



             Test Item    Value        Reference Range Interpretation Comments

 

             GLYCOSYLATED HEMOGLOBIN (HA1C) (test code = GLYHGB) 7.2 % HbA1   4.

8-6.0      H             

 

             ESTIMATED AVERAGE GLUCOSE (test code = EAG) 160 MG/DL              

                 





[id number] 16:25:00* 



             Test Item    Value        Reference Range Interpretation Comments

 

             TROPONIN-I (test code = TROPI) <0.015 ng/mL 0.00-0.056   N         

    





URINALYSIS [id number] 14:28:00* 



             Test Item    Value        Reference Range Interpretation Comments

 

             UA COLOR (test code = COLU) YELLOW       YELLOW                    

 

 

             UA APPEARANCE (test code = APPU) HAZY         CLEAR        A       

      

 

             UA GLUCOSE DIPSTICK (test code = DGLUU) norm mg/dL   NEGATIVE      

             

 

             UA BILIRUBIN DIPSTICK (test code = BILU) NEGATIVE mg/dL NEGATIVE   

                

 

             UA KETONE DIPSTICK (test code = KETU) neg mg/dL    NEGATIVE        

           

 

             UA SPECIFIC GRAVITY (test code = SGU) 1.015        1.001-1.035     

           

 

             UA BLOOD DIPSTICK (test code = JEN) 150 (3+) Popeye/uL NEGATIVE     A 

            

 

             UA PH DIPSTICK (test code = BEN) 6.0          5.0-8.0              

      

 

             UA PROTEIN DIPSTICK (test code = PROU) 15 (TRACE) mg/dL Neg-15     

  A             

 

             UA UROBILINIOGEN DIPSTICK (test code = URO) norm mg/dL   0.0-0.2   

                 

 

             UA NITRITE DIPSTICK (test code = FOZIA) NEGATIVE     NEGATIVE       

            

 

             UA LEUKOCYTE ESTERASE DIPSTICK (test code = LEUU) 100/uL (2+) uL NE

GATIVE     A             

 

             UA WBC (test code = WBCU) 5-10 per HPF 0-5          A            IN

 SOME URINARY TRACT INFECTIONS 

THERE MAY NOT BE ENOUGHWBCs IN THE URINE TO TRIGGER AN AUTOMATIC (REFLEX) 
URINECULTURE. A SEPERATE ORDER FOR URINE CULTURE IS RECOMMENDEDIF THERE IS 
STRONG SUPPORT FOR A URINARY TRACT INFECTIONCLINICALLY. 

 

             UA RBC (test code = RBCU) 0-3 per HPF  0-5                        

 

             UA EPITHELIAL CELLS (test code = EPIU) Few (2-5/hpf) per HPF Few   

                     

 

             UA BACTERIA (test code = BACU) TRACE per HPF NONE                  

     





Urine Source? Clean CatchUR HCG [id number] 14:28:00* 



             Test Item    Value        Reference Range Interpretation Comments

 

             UR HCG QUAL (test code = HCGQLU) NEGATIVE                          

     This HCGQL test is NOT applicable 

for MALE patients.Check with nurse about probable order error.If Tumor Marker 
Test needed, nurse should order test "HCGTU"(Test 
#550.61238)-------------------------------------------------------





Urine Source? Clean CatchURINALYSIS [id number] 14:22:00* 



             Test Item    Value        Reference Range Interpretation Comments

 

             UA COLOR (test code = COLU) YELLOW       YELLOW                    

 

 

             UA APPEARANCE (test code = APPU) HAZY         CLEAR        A       

      

 

             UA GLUCOSE DIPSTICK (test code = DGLUU) norm mg/dL   NEGATIVE      

             

 

             UA BILIRUBIN DIPSTICK (test code = BILU) NEGATIVE mg/dL NEGATIVE   

                

 

             UA KETONE DIPSTICK (test code = KETU) neg mg/dL    NEGATIVE        

           

 

             UA SPECIFIC GRAVITY (test code = SGU) 1.015        1.001-1.035     

           

 

             UA BLOOD DIPSTICK (test code = JEN) 150 (3+) Popeye/uL NEGATIVE     A 

            

 

             UA PH DIPSTICK (test code = BEN) 6.0          5.0-8.0              

      

 

             UA PROTEIN DIPSTICK (test code = PROU) 15 (TRACE) mg/dL Neg-15     

  A             

 

             UA UROBILINIOGEN DIPSTICK (test code = URO) norm mg/dL   0.0-0.2   

                 

 

             UA NITRITE DIPSTICK (test code = FOZIA) NEGATIVE     NEGATIVE       

            

 

             UA LEUKOCYTE ESTERASE DIPSTICK (test code = LEUU) 100/uL (2+) uL NE

GATIVE     A             

 

             UA WBC (test code = WBCU)  per HPF     0-5                        

 

             UA RBC (test code = RBCU)  per HPF     0-5                        

 

             UA EPITHELIAL CELLS (test code = EPIU)  per HPF     Few            

            

 

             UA BACTERIA (test code = BACU)  per HPF     NONE                   

    





Urine Source? Clean CatchUR HCG [id number] 14:22:00* 



             Test Item    Value        Reference Range Interpretation Comments

 

             UR HCG QUAL (test code = HCGQLU)                                   

      





Urine Source? Clean Catch- CT CHEST [id number] 14:14:00  Name: CHARLEE OSUNA                    : 1965 
Age/S: 53  / F         [address] [facility]           Unit #: [id number]     
Loc:               [place] [address]                Phys: Kacey Rowland MD   
                                              Acct: [id number]  Dis Date:     
         Status: REG ER                                  PHONE #: [phone]  
  Exam Date: 2019  1401                     FAX #: [phone]      
Reason: CHEST PAIN RADIATING TO BACK AND DOWN LEFT ARM      EXAMS:              
                                CPT CODE:      [id number] CT CHEST W/CONTRAST    
                   [address]                    HISTORY: Chest pain radiating to the
left arm.               COMPARISON: None available.               CT chest with 
contrast: 100 mL of Isovue-370. Automated exposure       control.               
Unremarkable aorta without aneurysm or dissection. Unremarkable       pulmonary 
arteries (not performed as PE protocol). Well-opacified SVC       and the neck 
vasculature.               Thyroid glands are unremarkable although incompletely
included.       Esophageal wall is not thickened. No pathologic adenopathy. 
Cardiac       silhouette is mildly enlarged without pericardial effusion. No    
  atherosclerotic calcifications visible within the coronary arteries.          
    Visualized upper abdomen demonstrating moderately distended       
gallbladder with large calcified gallstones and fatty liver. The       subcutane
ous tissues and the musculature are normal in appearance. No       lytic or wilmer
tic lesions visible within the bony skeleton. DJD.               The lungs are c
lear of infiltrates, effusion or congestion. No       bronchiectasis, honeycombi
ng or fibrosis. Calcified granuloma in the       right middle lobe. Dependent ch
anges without parenchymal mass or       nodules.                 IMPRESSION:    
              Unremarkable aorta. No pulmonary embolism the proximal and mid    
    pulmonary artery branches. Distal branches are very limited due to         
poor resolution (not performed as PE protocol). The lungs are clear         with
dependent changes.                   Hepatomegaly with fatty infiltration and 
large calcified gallstone         within moderately distended gallbladder.      
   ** Electronically Signed by VINCENT Ashraf on 2019 at 1414 **       
              Reported and signed by: Laci Ashraf M.D.           PAGE  1      
                Signed Report                    (CONTINUED)   Name: CHARLEE FRITZ                    : 1965 Age
/S: 53  / F         [address] [facility]           Unit #: [id number]     Loc: 
             [place] [address]                Phys: Kacey Rowland MD         
                                        Acct: [id number]  Dis Date:           
   Status: REG ER                                  PHONE #: [phone]     
Exam Date: 2019  1401                     FAX #: [phone]      Reason:
CHEST PAIN RADIATING TO BACK AND DOWN LEFT ARM      EXAMS:                      
                        CPT CODE:      [id number] CT CHEST W/CONTRAST            
           [address]               <Continued>                                      
CC: Kacey Rowland MD                                                          
                                                           Technologist:Yakelin Phelps                     CTDI:        DLP:        Trnscb Date/Time: 2019
() t.SDR.TH4                        Orig Print D/T: S: 2019 (1417)    
  CTDI:          DLP:          PAGE  2                       Signed Report      
                        [id number] 13:05:00* 



             Test Item    Value        Reference Range Interpretation Comments

 

             D-DIMER (test code = DDIMER) 187 ng/ml    < 600                    

  





BASIC METABOLIC [id number] 13:00:00* 



             Test Item    Value        Reference Range Interpretation Comments

 

             SODIUM (test code = NA) 140 mmol/L   135-148      N             

 

             POTASSIUM (test code = K) 3.6 mmol/L   3.5-5.1      N             

 

             CHLORIDE (test code = CL) 103 mmol/L   101-109      N             

 

             CARBON DIOXIDE (test code = CO2) 27.8 mmol/L  21-32        N       

      

 

             ANION GAP (test code = GAP) 13 mmol/L    10-20        N            

 

 

             GLUCOSE (test code = GLU) 147 mg/dL           H             

 

             BLOOD UREA NITROGEN (test code = BUN) 16 mg/dL     3-21         N  

           

 

             GLOMERULAR FILTRATION RATE (test code = GFR) 54 mL/min    >=60     

                 Estimated GFR by 

using Modified MDRD formula.Chronic kidney disease is defined as either kidney 
damageor GFR <60 mL/min/1.73 m2 for >3 months.

 

             CREATININE (test code = CREAT) 1.06 mg/dL   0.55-1.3     N         

    

 

             BUN/CREATININE RATIO (test code = BUN/CREA) 15.1         10-20     

   N             

 

             CALCIUM (test code = CA) 9.5 mg/dL    8.4-10.2     N             





[id number] 13:00:00* 



             Test Item    Value        Reference Range Interpretation Comments

 

             TROPONIN-I (test code = TROPI) <0.015 ng/mL 0.00-0.056   N         

    





BASIC METABOLIC [id number] 12:46:00* 



             Test Item    Value        Reference Range Interpretation Comments

 

             SODIUM (test code = NA) 140 mmol/L   135-148      N             

 

             POTASSIUM (test code = K) 3.6 mmol/L   3.5-5.1      N             

 

             CHLORIDE (test code = CL) 103 mmol/L   101-109      N             

 

             CARBON DIOXIDE (test code = CO2) 27.8 mmol/L  21-32        N       

      

 

             ANION GAP (test code = GAP) 13 mmol/L    10-20        N            

 

 

             GLUCOSE (test code = GLU) 147 mg/dL           H             

 

             BLOOD UREA NITROGEN (test code = BUN) 16 mg/dL     3-21         N  

           

 

             GLOMERULAR FILTRATION RATE (test code = GFR) 54 mL/min    >=60     

                 Estimated GFR by 

using Modified MDRD formula.Chronic kidney disease is defined as either kidney 
damageor GFR <60 mL/min/1.73 m2 for >3 months.

 

             CREATININE (test code = CREAT) 1.06 mg/dL   0.55-1.3     N         

    

 

             BUN/CREATININE RATIO (test code = BUN/CREA) 15.1         10-20     

   N             

 

             CALCIUM (test code = CA) 9.5 mg/dL    8.4-10.2     N             





[id number] 12:46:00* 



             Test Item    Value        Reference Range Interpretation Comments

 

             TROPONIN-I (test code = TROPI)  ng/mL       0-0.045                

    





CBC W/O [id number] 12:40:00* 



             Test Item    Value        Reference Range Interpretation Comments

 

             WHITE BLOOD CELL (test code = WBC) 16.0 K/mm3   4.5-12.5     H     

        

 

             RED BLOOD CELL (test code = RBC) 4.99 mill/mm3 3.7-5.2      N      

       

 

             HEMOGLOBIN (test code = HGB) 14.5 gram/dL 11.5-15.5    N           

  

 

             HEMATOCRIT (test code = HCT) 43.3 %       36.0-46.0    N           

  

 

             MEAN CELL VOLUME (test code = MCV) 86.8 fL      80-98        N     

        

 

             MEAN CELL HGB (test code = MCH) 29.1 picogram 27.0-33.0    N       

      

 

             MEAN CELL HGB CONCETRATION (test code = MCHC) 33.5 gram/dL 33.0-36.

0    N             

 

             RED CELL DISTRIBUTION WIDTH (test code = RDW) 13.7 %       11.6-16.

2    N             

 

             RED CELL DISTRIBUTION WIDTH SD (test code = RDW-SD) 42.2 fL      39

.1-52.0    N             

 

             PLATELET COUNT (test code = PLT) 246 K/mm3    150-450      N       

      

 

             MEAN PLATELET VOLUME (test code = MPV) 10.4 fL      6.7-11.0     N 

            





- XR CHEST 1 -64-80 12:22:00  Name: CHARLEE OSUNA                
    : 1965 Age/S:53  /F            [address] 
[facility]           Unit#:[id number]     Loc: [place], 
[place] [address]               Phys: Kacey Rowland MD                                 
               Acct#: [id number] Dis Date:                   PHONE #: 
[phone]      Status: REG                                    FAX #: 
[phone]      Exam Date: 2019           Reason: CHEST PAIN            
                             EXAMS:                                             
 CPT CODE:      [id number] XR CHEST 1 V                               [address]      
             HISTORY: Chest pain.               COMPARISON: 2018.       
       No acute infiltrates, effusion or congestion is noted. Suboptimal       
inspiration. Dependent changes.               Cardiomegaly.                  
IMPRESSION:                    No acute infiltrates, effusion or congestion. 
Suboptimal inspiration         with dependent changes.          ** 
Electronically Signed by VINCENT Ashraf on 2019 at 1222 **             
        Reported and signed by: Laci Ashraf M.D.                           CC:
Kacey Rowland MD                                                              
                                                       Technologist: Yakelin Phelps
                                           Trnscrpt Data: 2019 (1222) 
Kurt.[email or web address]                          Orig Print D/T: S: 2019 (3018)         
               PAGE  1                       Signed Report

## 2020-06-08 NOTE — EMERGENCY DEPARTMENT NOTE
History of Present Illnes


History of Present Illness


Chief Complaint:  General Medicine Complaints


History of Present Illness


This is a 55 year old  female  .c/o dizziness weakx 3 wks noted bs 593 in ed c/o

n/v


Chief Complaint Comment AT [facility], FELT DIZZY AND WEAK LAST 3 WEEKS. AAOX4. GCS 15, 

AMBULATORY. NO HX DIABETIES, BUT . NAUSEA AND VOMITING.


Historian:  Paramedic/EMS


Arrival Mode:  [place]


EMS Treatment PTA:  IV, See EMS Report


Additional Treatment PTA:  24G LEFT HAND


Onset (how long ago):  week(s) (3weeks)


Radiation:  non-radiation, back, neck, extremity, abdomen, periumbilical, flank,

proximal, distal, other


Severity:  mild


Onset quality:  gradual


Duration (how long):  week(s) (3 wks)


Progression:  worsening


Context:  recent illness, recent surgery, recent immobilization, recent travel, 

trauma/injury, new medications, hx of DVT/PE, non-compliance w/ medications, 

other


Relieving factors:  none


Exacerbating factors:  none


Treatments prior to arrival:  none





Past Medical/Family History


Physician Review


I have reviewed the patient's past medical and family history.  Any updates have

been documented here.





Past Medical History


Recent Fever:  No


Clinical Suspicion of Infectio:  No


New/Unexplained Change in Ment:  No


Past Medical History:  Hypertension


Past Surgical History:  Cholecysctectomy





Social History


Smoking Cessation:  Never Smoker


Alcohol Use:  None


Any Illegal Drug Use:  No


TB Exposure/Symptoms:  No


Physically hurt or threatened:  No





Family History


Family history of heart diseas:  No





Other


Any Pre-Existing Lines (PICC,:  No





Review of Systems


Review of Systems


Constitutional:  malaise, weakness


EENTM:  no symptoms


Cardiovascular:  no symptoms


Respiratory:  no symptoms


Gastrointestinal:  no symptoms


Genitourinary:  no symptoms


Musculoskeletal:  no symptoms


Neurological:  weakness, other (dizziness)


Psychological:  no symptoms


Endocrine:  no symptoms


Hematological/Lymphatic:  no symptoms


Review of other systems


All other systems reviewed and negative.


This is a 55 year old  female  .c/o dizziness weakx 3 wks noted bs 593 in ed c/o

n/v





Physical Exam


Related Data


Allergies:  


Coded Allergies:  


     No Known Allergies (Unverified , 6/8/20)


Triage Vital Signs





Vital Signs








  Date Time  Temp Pulse Resp B/P (MAP) Pulse Ox O2 Delivery O2 Flow Rate FiO2


 


6/8/20 16:54 97.6 99 18 184/72 99   











Physical Exam


CONSTITUTIONAL





Constitutional:  well-developed, well-nourished


HENT


HENT:  normocephalic, atraumatic, oropharynx clear/moist, nose normal


HENT L/R:  left ext ear normal, right ext ear normal


EYES





Eyes:  PERRL, conjunctivae normal


NECK


Neck:  ROM normal


PULMONARY


Pulmonary:  effort normal, breath sounds normal


CARDIOVASCULAR





Cardiovascular:  regular rhythm, heart sounds normal, capillary refill normal, 

normal rate


GASTROINTESTINAL





Abdominal:  soft, nontender, bowel sounds normal


GENITOURINARY





Genitourinary:  exam deferred


SKIN


Skin:  warm, dry


MUSCULOSKELETAL





Musculoskeletal:  ROM normal


NEUROLOGICAL





Neurological:  alert, oriented x 3, no gross motor or sensory deficits


PSYCHOLOGICAL


Psychological:  mood/affect normal, judgement normal


Exam - additional comments


This is a 55 year old  female  .c/o dizziness weakx 3 wks noted bs 593 in ed c/o

n/v





Results


Laboratory


Laboratory





Laboratory Tests








Test


 6/8/20


18:00


 


White Blood Count


 10.58 x10e3/uL


(4.8-10.8)


 


Red Blood Count


 5.32 x10e6/uL


(3.6-5.1)


 


Hemoglobin


 14.8 g/dL


(12.0-16.0)


 


Hematocrit


 43.6 %


(34.2-44.1)


 


Mean Corpuscular Volume


 82.0 fL


(81-99)


 


Mean Corpuscular Hemoglobin


 27.8 pg


(28-32)


 


Mean Corpuscular Hemoglobin


Concent 33.9 g/dL


(31-35)


 


Red Cell Distribution Width


 12.4 %


(11.7-14.4)


 


Platelet Count


 230 x10e3/uL


(140-360)


 


Neutrophils (%) (Auto)


 65.0 %


(38.7-80.0)


 


Lymphocytes (%) (Auto)


 27.9 %


(18.0-39.1)


 


Monocytes (%) (Auto)


 5.2  %


(4.4-11.3)


 


Eosinophils (%) (Auto)


 1.2 %


(0.0-6.0)


 


Basophils (%) (Auto)


 0.4 %


(0.0-1.0)


 


Neutrophils # (Auto) 6.9 (2.1-6.9) 


 


Lymphocytes # (Auto) 3.0 (1.0-3.2) 


 


Monocytes # (Auto) 0.6 (0.2-0.8) 


 


Eosinophils # (Auto) 0.1 (0.0-0.4) 


 


Basophils # (Auto) 0.0 (0.0-0.1) 


 


Absolute Immature Granulocyte


(auto 0.03 x10e3/uL


(0-0.1)


 


Sodium Level


 137 mmol/L


(136-145)


 


Potassium Level


 3.0 mmol/L


(3.5-5.1)


 


Chloride Level


 100 mmol/L


()


 


Carbon Dioxide Level


 21 mmol/L


(22-29)


 


Anion Gap


 19.0 mmol/L


(8-16)


 


Blood Urea Nitrogen 9 mg/dL (7-26) 


 


Creatinine


 1.11 mg/dL


(0.57-1.11)


 


Estimat Glomerular Filtration


Rate 51 ML/MIN


(60-)


 


BUN/Creatinine Ratio 8 (6-25) 


 


Glucose Level


 470 mg/dL


()


 


Calcium Level


 9.6 mg/dL


(8.4-10.2)


 


Magnesium Level


 1.4 MG/DL


(1.3-2.1)


 


Total Bilirubin


 0.5 mg/dL


(0.2-1.2)


 


Aspartate Amino Transf


(AST/SGOT) 59 IU/L (5-34) 





 


Alanine Aminotransferase


(ALT/SGPT) 76 IU/L (0-55) 





 


Alkaline Phosphatase


 151 IU/L


()


 


Creatine Kinase


 54 IU/L


()


 


Creatine Kinase MB


 0.70 ng/mL


(0-5.0)


 


Troponin I


 0.020 ng/mL


(0-0.300)


 


B-Type Natriuretic Peptide


 16.4 pg/mL


(0-100)


 


Total Protein


 8.4 g/dL


(6.5-8.1)


 


Albumin


 3.7 g/dL


(3.5-5.0)


 


Globulin


 4.7 g/dL


(2.3-3.5)


 


Albumin/Globulin Ratio 0.8 (0.8-2.0) 


 


Triglycerides Level


 202 MG/DL


(0-149)


 


Cholesterol Level


 167 MD/DL


(0-199)


 


LDL Cholesterol


 62 MG/DL


()


 


HDL Cholesterol


 65 MG/DL


(40-60)


 


Cholesterol/HDL Ratio 2.6 (3.0-3.6) 


 


Amylase Level


 20 U/L


()


 


Lipase 22 U/L (8-78) 








Lab results reviewed:  Yes





Critical Care Time


Subsequent provider


I assumed direction of critical care for this patient from another provider of 

my specialty.





Assessment & Plan


Reassessment


Reassessment


This is a 55 year old  female  .c/o dizziness weak x 3 wks noted bs 593 in ed 

c/o n/v- lab ordered pt medicated w/ ns bolus x 2 and zofran











pt medicated w/ reg insulin 10u





Assessment & Plan


Final Impression:  


(1) Hypokalemia


(2) Weakness


(3) Obesity


(4) Type 2 diabetes mellitus


Assessment & Plan


discussed lab results plan of care and need for admit 








spoke w/ Dr Xiong will admit


Depart Disposition:  ADMITTED


Last Vital Signs











  Date Time  Temp Pulse Resp B/P (MAP) Pulse Ox O2 Delivery O2 Flow Rate FiO2


 


6/8/20 16:54 97.6 99 18 184/72 99   

















RONALD BELCHER                  Jun 8, 2020 18:11

## 2020-06-09 VITALS — SYSTOLIC BLOOD PRESSURE: 177 MMHG | DIASTOLIC BLOOD PRESSURE: 94 MMHG

## 2020-06-09 VITALS — DIASTOLIC BLOOD PRESSURE: 79 MMHG | SYSTOLIC BLOOD PRESSURE: 156 MMHG

## 2020-06-09 VITALS — SYSTOLIC BLOOD PRESSURE: 146 MMHG | DIASTOLIC BLOOD PRESSURE: 79 MMHG

## 2020-06-09 VITALS — DIASTOLIC BLOOD PRESSURE: 86 MMHG | SYSTOLIC BLOOD PRESSURE: 149 MMHG

## 2020-06-09 VITALS — DIASTOLIC BLOOD PRESSURE: 80 MMHG | SYSTOLIC BLOOD PRESSURE: 132 MMHG

## 2020-06-09 VITALS — DIASTOLIC BLOOD PRESSURE: 94 MMHG | SYSTOLIC BLOOD PRESSURE: 177 MMHG

## 2020-06-09 VITALS — SYSTOLIC BLOOD PRESSURE: 154 MMHG | DIASTOLIC BLOOD PRESSURE: 78 MMHG

## 2020-06-09 VITALS — SYSTOLIC BLOOD PRESSURE: 129 MMHG | DIASTOLIC BLOOD PRESSURE: 78 MMHG

## 2020-06-09 VITALS — SYSTOLIC BLOOD PRESSURE: 149 MMHG | DIASTOLIC BLOOD PRESSURE: 86 MMHG

## 2020-06-09 LAB
ALBUMIN SERPL-MCNC: 2.7 G/DL (ref 3.5–5)
ALBUMIN/GLOB SERPL: 0.8 {RATIO} (ref 0.8–2)
ALP SERPL-CCNC: 100 IU/L (ref 40–150)
ALT SERPL-CCNC: 51 IU/L (ref 0–55)
ANION GAP SERPL CALC-SCNC: 11.1 MMOL/L (ref 8–16)
BASOPHILS # BLD AUTO: 0 10*3/UL (ref 0–0.1)
BASOPHILS NFR BLD AUTO: 0.3 % (ref 0–1)
BUN SERPL-MCNC: 6 MG/DL (ref 7–26)
BUN/CREAT SERPL: 8 (ref 6–25)
CALCIUM SERPL-MCNC: 8.1 MG/DL (ref 8.4–10.2)
CHLORIDE SERPL-SCNC: 110 MMOL/L (ref 98–107)
CK MB SERPL-MCNC: 0.8 NG/ML (ref 0–5)
CK MB SERPL-MCNC: 0.9 NG/ML (ref 0–5)
CK SERPL-CCNC: 35 IU/L (ref 29–168)
CK SERPL-CCNC: 37 IU/L (ref 29–168)
CO2 SERPL-SCNC: 23 MMOL/L (ref 22–29)
DEPRECATED NEUTROPHILS # BLD AUTO: 5.2 10*3/UL (ref 2.1–6.9)
EGFRCR SERPLBLD CKD-EPI 2021: > 60 ML/MIN (ref 60–?)
EOSINOPHIL # BLD AUTO: 0.3 10*3/UL (ref 0–0.4)
EOSINOPHIL NFR BLD AUTO: 2.8 % (ref 0–6)
ERYTHROCYTE [DISTWIDTH] IN CORD BLOOD: 12.6 % (ref 11.7–14.4)
GLOBULIN PLAS-MCNC: 3.4 G/DL (ref 2.3–3.5)
GLUCOSE SERPLBLD-MCNC: 189 MG/DL (ref 74–118)
HCT VFR BLD AUTO: 37.9 % (ref 34.2–44.1)
HGB BLD-MCNC: 12.9 G/DL (ref 12–16)
LYMPHOCYTES # BLD: 3.3 10*3/UL (ref 1–3.2)
LYMPHOCYTES NFR BLD AUTO: 35.1 % (ref 18–39.1)
MAGNESIUM SERPL-MCNC: 1.3 MG/DL (ref 1.3–2.1)
MCH RBC QN AUTO: 27.6 PG (ref 28–32)
MCHC RBC AUTO-ENTMCNC: 34 G/DL (ref 31–35)
MCV RBC AUTO: 81.2 FL (ref 81–99)
MONOCYTES # BLD AUTO: 0.6 10*3/UL (ref 0.2–0.8)
MONOCYTES NFR BLD AUTO: 6.4 % (ref 4.4–11.3)
NEUTS SEG NFR BLD AUTO: 55.1 % (ref 38.7–80)
PLATELET # BLD AUTO: 197 X10E3/UL (ref 140–360)
POTASSIUM SERPL-SCNC: 3.1 MMOL/L (ref 3.5–5.1)
RBC # BLD AUTO: 4.67 X10E6/UL (ref 3.6–5.1)
SODIUM SERPL-SCNC: 141 MMOL/L (ref 136–145)

## 2020-06-09 RX ADMIN — INSULIN GLARGINE SCH UNITS: 100 INJECTION, SOLUTION SUBCUTANEOUS at 00:33

## 2020-06-09 RX ADMIN — INSULIN LISPRO SCH UNIT: 100 INJECTION, SOLUTION INTRAVENOUS; SUBCUTANEOUS at 21:10

## 2020-06-09 RX ADMIN — SODIUM CHLORIDE PRN MG: 900 INJECTION INTRAVENOUS at 15:08

## 2020-06-09 RX ADMIN — INSULIN LISPRO SCH UNIT: 100 INJECTION, SOLUTION INTRAVENOUS; SUBCUTANEOUS at 11:30

## 2020-06-09 RX ADMIN — ASPIRIN SCH MG: 81 TABLET, COATED ORAL at 08:30

## 2020-06-09 RX ADMIN — POTASSIUM CHLORIDE, DEXTROSE MONOHYDRATE AND SODIUM CHLORIDE SCH MLS/HR: 150; 5; 450 INJECTION, SOLUTION INTRAVENOUS at 15:08

## 2020-06-09 RX ADMIN — INSULIN LISPRO SCH UNIT: 100 INJECTION, SOLUTION INTRAVENOUS; SUBCUTANEOUS at 09:13

## 2020-06-09 RX ADMIN — INSULIN GLARGINE SCH UNITS: 100 INJECTION, SOLUTION SUBCUTANEOUS at 21:11

## 2020-06-09 RX ADMIN — INSULIN LISPRO SCH UNIT: 100 INJECTION, SOLUTION INTRAVENOUS; SUBCUTANEOUS at 16:35

## 2020-06-09 RX ADMIN — Medication PRN MG: at 14:46

## 2020-06-09 RX ADMIN — INSULIN LISPRO SCH UNIT: 100 INJECTION, SOLUTION INTRAVENOUS; SUBCUTANEOUS at 07:30

## 2020-06-09 RX ADMIN — LISINOPRIL SCH MG: 10 TABLET ORAL at 08:30

## 2020-06-09 RX ADMIN — Medication PRN MG: at 08:30

## 2020-06-09 NOTE — CONSULTATION
DATE OF CONSULTATION:  06/09/2020

 

Endocrine consultation.

 

The patient of Dr. Xiong. 

 

Thank you very much for referring this patient.

 

HISTORY OF PRESENT ILLNESS:  This is a 55-year-old  lady, who was referred to me

for evaluation of diabetes mellitus.  According to the daughter, the patient is a known

diabetic for last few years, but she has a lot of denial about it and has not been

taking any medications for it.  At this time, the patient came to the hospital with

history of nausea, vomiting, and pain in the abdomen.  On further evaluation in the

emergency room, her blood sugar was found to be 470 and her anion gap was 19.  Her

hemoglobin A1c is 10.7.  The patient also has history of hypertension and was also found

to be hypokalemic.  She does have family history of diabetes mellitus.  The patient is

postmenopausal.  No history of coronary artery disease or strokes in the past.  She is a

nonsmoker. 

 

PHYSICAL EXAMINATION:

GENERAL:  Today, the patient is alert, awake, little bit apprehensive.  She is

moderately overweight. 

VITAL SIGNS:  Heart rate is around 78, blood pressure is 156/80 mmHg. 

HEENT:  Essentially unremarkable.  Thyroid is palpable.  Clinically, she is near

euthyroid. 

CHEST:  Bilateral vesicular breathing.  No rales. 

CARDIOVASCULAR:  First and second heart sounds.  There are no third or fourth heart

sounds.  Ejection systolic murmur grade 2/6. 

ABDOMEN:  The patient has epigastric tenderness. 

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

IMPRESSION:  Diabetes mellitus type 2, uncontrolled with complications, mild diabetic

ketoacidosis, obesity. 

 

PLAN:  At this time, she has been started on the Lantus 30 units at bedtime and Humalog

10 with each meal.  The patient needs extensive diabetic and dietary education.  Thanks

again for referring this patient.  I will follow this patient with you. 

 

I will follow this patient along with you.

 

 

 

 

______________________________

MD FATOU Canas/ZHANE

D:  06/09/2020 14:28:32

T:  06/09/2020 16:23:23

Job #:  [id number]

## 2020-06-09 NOTE — NUR
Pt lying comfortable on bed, AAOx4, VSS. 2nd unit of PRBC was verified by 2 
RN's & hung at this time. VSS per cardiac monitor, No c/o fever, SOB or 
reactions.

## 2020-06-09 NOTE — NUR
2nd unit of PRBC was transfused. Dr Xiong at bedside talking with pt regarding 
results & follow up instructions. Pt for discharge to home. VSS.

## 2020-06-09 NOTE — NUR
First unit done. Pt has no c/o of reaction since blood transfusion was started. 
2nd unit obtained from blood bank, 2nd RN at bedside for validation prior to 
starting blood transfusion, pt stable.

## 2020-06-09 NOTE — NUR
Nutrition Screen Note



RD Recommendation for Physician: 

- Continue 1800 ADA diet

- Recommend outpatient CDE education consult upon discharge



Plan of Care: RD following, monitoring for tolerance and adequacy

- Diet education provided 6/9



Nutrition reason for involvement:

MD Consult, MST2, RN Consult- diet education



Primary Diagnose(s): hypokalemia, obesity, new onset DM2



PMH: no H&P 



Ht: 60 in 

Wt: 200 lb

BMI: 39.1 kg/m2

IBW: 100 lb



RD Assessment:

(6/9) 55 YOF admitted for hypokalemia found to have new onset DM. Pt seen today per consult 
for diet education. Pt [place] speaking only, daughter at bedside provided translation. Pt 
and daughter educated on CHO sources, serving sizes, meal planning, label reading, and foods 
to avoid. All questions and concerns addressed at time of visit and education materials 
provided. Pt's daughter reports N/V PTA, which has resolved and good intake reported. Chart 
reviewed. Labs and meds reviewed. Will continue to monitor. 



Current Diet: 1800 ADA 



Malnutrition Evaluation (6/9/20)

The patient does not meet criteria for a specified degree of malnutrition at this time. Will 
re-evaluate at follow-up as appropriate. 





Diet Education Needs Assessment:

Diet education indicated, education provided 6/9. 



Learner(s): pt, pt's daughter

Barriers: none

Cultural/Language Modifications: materials provided in [place]

Readiness: ready

Method: handouts, discussion

Topics: DM2 nutrition therapy

Understanding/Compliance: fair 



Diet tolerance: tolerating po 



Nutrition Care Level: low 





Signed: Linda Clayton RD, LD, [facility]

## 2020-06-09 NOTE — HISTORY AND PHYSICAL
CHIEF COMPLAINT:  Severe hyperglycemia, blood sugar is 470 with A1c of 11. 

 

The patient has also hypertensive urgency as well.

 

HISTORY OF PRESENT ILLNESS:  The patient is a 55-year-old female, came to the hospital

with complaint of increase in generalized ache and pain and also dizziness x3 weeks

along with headache.  The patient's blood pressure was elevated.  She had no history of

diabetes, but blood sugar in the emergency room fingerstick was 593.  The patient also

complained of nausea and vomiting as well.  The patient is admitted for further

evaluation. 

 

PAST MEDICAL HISTORY:  Hypertension, not on any medication, noncompliant.

 

PAST SURGICAL HISTORY:  Cholecystectomy.

 

SOCIAL HISTORY:  The patient does not smoke or use alcohol.  No recreational drug use.

 

ALLERGIES:  NO KNOWN ALLERGIES.

 

HOME MEDICATIONS:  None.

 

PHYSICAL EXAMINATION:

VITAL SIGNS:  Temperature is 98, blood pressure is 177/94, pulse rate 78, respiration

20. 

GENERAL:  The patient is not in acute distress.  She is awake. 

HEENT:  Normocephalic and atraumatic.  Anicteric. 

NECK:  Supple grossly. 

PULMONARY:  Diminished breath sounds bilaterally without any wheezes or rales. 

CARDIOVASCULAR:  S1, S2.  Regular rate and rhythm. 

ABDOMEN:  Soft, nontender, non-distention. 

EXTREMITIES:  No gross cyanosis or edema. 

NEUROLOGIC:  No gross focal deficit.

LABORATORY DATA:  Sodium is 137, potassium 3, chloride 100, bicarb 21, BUN is 9,

creatinine 1.1, glucose is 593.  Fingerstick with chemistry 470. 

 

AST is 59, ALT is 76, alkaline phosphate is 151.  Troponin I negative.  Triglycerides

202, LDL 62.  Amylase and lipase normal.  TSH is normal. 

 

IMPRESSION:  

1. Uncontrolled diabetes.

2. Hypertensive urgency.

3. Noncompliance to hypertensive medication.

 

PLAN:  Insulin coverage.  Lantus and Humalog with insulin sliding scale coverage.

Hemoglobin A1c, thyroid function tests already done.  Hemoglobin A1c is 11.

Consultation with Dr. Lonnie Rojas.  Dietitian consultation.  Blood pressure control,

start the patient on lisinopril. 

 

We will monitor the patient closely.  The patient will need a diabetic education prior

to discharge. 

 

 

 

 

______________________________

MD BEBE Ryan/ZHANE

D:  06/09/2020 09:00:26

T:  06/09/2020 14:50:06

Job #:  [id number]

## 2020-06-10 VITALS — DIASTOLIC BLOOD PRESSURE: 70 MMHG | SYSTOLIC BLOOD PRESSURE: 107 MMHG

## 2020-06-10 VITALS — SYSTOLIC BLOOD PRESSURE: 150 MMHG | DIASTOLIC BLOOD PRESSURE: 61 MMHG

## 2020-06-10 VITALS — DIASTOLIC BLOOD PRESSURE: 70 MMHG | SYSTOLIC BLOOD PRESSURE: 149 MMHG

## 2020-06-10 VITALS — SYSTOLIC BLOOD PRESSURE: 152 MMHG | DIASTOLIC BLOOD PRESSURE: 72 MMHG

## 2020-06-10 VITALS — DIASTOLIC BLOOD PRESSURE: 61 MMHG | SYSTOLIC BLOOD PRESSURE: 150 MMHG

## 2020-06-10 VITALS — DIASTOLIC BLOOD PRESSURE: 61 MMHG | SYSTOLIC BLOOD PRESSURE: 117 MMHG

## 2020-06-10 VITALS — SYSTOLIC BLOOD PRESSURE: 160 MMHG | DIASTOLIC BLOOD PRESSURE: 85 MMHG

## 2020-06-10 LAB
ANION GAP SERPL CALC-SCNC: 12.4 MMOL/L (ref 8–16)
BASOPHILS # BLD AUTO: 0 10*3/UL (ref 0–0.1)
BASOPHILS NFR BLD AUTO: 0.4 % (ref 0–1)
BUN SERPL-MCNC: 7 MG/DL (ref 7–26)
BUN/CREAT SERPL: 10 (ref 6–25)
CALCIUM SERPL-MCNC: 9 MG/DL (ref 8.4–10.2)
CHLORIDE SERPL-SCNC: 107 MMOL/L (ref 98–107)
CO2 SERPL-SCNC: 24 MMOL/L (ref 22–29)
DEPRECATED NEUTROPHILS # BLD AUTO: 3.6 10*3/UL (ref 2.1–6.9)
EGFRCR SERPLBLD CKD-EPI 2021: > 60 ML/MIN (ref 60–?)
EOSINOPHIL # BLD AUTO: 0.2 10*3/UL (ref 0–0.4)
EOSINOPHIL NFR BLD AUTO: 3.1 % (ref 0–6)
ERYTHROCYTE [DISTWIDTH] IN CORD BLOOD: 12.4 % (ref 11.7–14.4)
GLUCOSE SERPLBLD-MCNC: 230 MG/DL (ref 74–118)
HCT VFR BLD AUTO: 39.3 % (ref 34.2–44.1)
HGB BLD-MCNC: 13.4 G/DL (ref 12–16)
LYMPHOCYTES # BLD: 2.9 10*3/UL (ref 1–3.2)
LYMPHOCYTES NFR BLD AUTO: 39.6 % (ref 18–39.1)
MCH RBC QN AUTO: 28.2 PG (ref 28–32)
MCHC RBC AUTO-ENTMCNC: 34.1 G/DL (ref 31–35)
MCV RBC AUTO: 82.7 FL (ref 81–99)
MONOCYTES # BLD AUTO: 0.5 10*3/UL (ref 0.2–0.8)
MONOCYTES NFR BLD AUTO: 7.1 % (ref 4.4–11.3)
NEUTS SEG NFR BLD AUTO: 49.7 % (ref 38.7–80)
PLATELET # BLD AUTO: 193 X10E3/UL (ref 140–360)
POTASSIUM SERPL-SCNC: 3.4 MMOL/L (ref 3.5–5.1)
RBC # BLD AUTO: 4.75 X10E6/UL (ref 3.6–5.1)
SODIUM SERPL-SCNC: 140 MMOL/L (ref 136–145)

## 2020-06-10 RX ADMIN — LISINOPRIL SCH MG: 10 TABLET ORAL at 09:00

## 2020-06-10 RX ADMIN — POTASSIUM CHLORIDE, DEXTROSE MONOHYDRATE AND SODIUM CHLORIDE SCH MLS/HR: 150; 5; 450 INJECTION, SOLUTION INTRAVENOUS at 11:00

## 2020-06-10 RX ADMIN — POTASSIUM CHLORIDE, DEXTROSE MONOHYDRATE AND SODIUM CHLORIDE SCH MLS/HR: 150; 5; 450 INJECTION, SOLUTION INTRAVENOUS at 00:32

## 2020-06-10 RX ADMIN — INSULIN LISPRO SCH UNIT: 100 INJECTION, SOLUTION INTRAVENOUS; SUBCUTANEOUS at 16:30

## 2020-06-10 RX ADMIN — Medication PRN MG: at 15:06

## 2020-06-10 RX ADMIN — Medication PRN MG: at 21:09

## 2020-06-10 RX ADMIN — INSULIN LISPRO SCH UNIT: 100 INJECTION, SOLUTION INTRAVENOUS; SUBCUTANEOUS at 07:30

## 2020-06-10 RX ADMIN — INSULIN LISPRO SCH UNIT: 100 INJECTION, SOLUTION INTRAVENOUS; SUBCUTANEOUS at 11:30

## 2020-06-10 RX ADMIN — INSULIN LISPRO SCH UNIT: 100 INJECTION, SOLUTION INTRAVENOUS; SUBCUTANEOUS at 20:05

## 2020-06-10 RX ADMIN — ASPIRIN SCH MG: 81 TABLET, COATED ORAL at 09:00

## 2020-06-10 NOTE — DIAGNOSTIC IMAGING REPORT
History: Headaches

Comparison studies: None



Technique: 

Sagittal T2; axial DWI, FLAIR, MPGR, T1, Coronal FLAIR.

Intravenous contrast: None



Findings:



Scalp: Normal in signal . No masses .

Bone marrow: Normal in signal intensity.



Extra-axial:

No masses, no fluid collections. 



Brain sulci: Appropriate for age.

Ventricles: Normal in size . No hydrocephalus .



Parenchyma:

Few T-2/flair hyperintensities of the supratentorial white matter, nonspecific

and is seen with mild chronic microvascular ischemic changes.

No masses, hemorrhage, acute or chronic vascular insults.



Suprasellar region: No abnormalities.

Craniocervical junction: No abnormalities.  Patent foramen magnum.  No Chiari

one malformation.

Vessels: Normal flow-voids in the arteries and sinuses.



IMPRESSION:



1.  No acute abnormalities.



2.  Mild chronic microvascular ischemic changes of the white matter



Signed by: DR Ramírez Sprague M.D. on 6/10/2020 12:47 PM

## 2020-06-10 NOTE — NUR
RCD PT AT BED PT IS ALERT AND ORIENTED RESTING ON BED IV PATENT AND RUNNING 125 ML /HR BED 
LOW AND LOCKED CALL LIGHT IN REACH

## 2020-06-11 VITALS — SYSTOLIC BLOOD PRESSURE: 148 MMHG | DIASTOLIC BLOOD PRESSURE: 58 MMHG

## 2020-06-11 VITALS — DIASTOLIC BLOOD PRESSURE: 80 MMHG | SYSTOLIC BLOOD PRESSURE: 96 MMHG

## 2020-06-11 VITALS — SYSTOLIC BLOOD PRESSURE: 152 MMHG | DIASTOLIC BLOOD PRESSURE: 78 MMHG

## 2020-06-11 VITALS — SYSTOLIC BLOOD PRESSURE: 96 MMHG | DIASTOLIC BLOOD PRESSURE: 80 MMHG

## 2020-06-11 VITALS — DIASTOLIC BLOOD PRESSURE: 82 MMHG | SYSTOLIC BLOOD PRESSURE: 151 MMHG

## 2020-06-11 VITALS — SYSTOLIC BLOOD PRESSURE: 163 MMHG | DIASTOLIC BLOOD PRESSURE: 95 MMHG

## 2020-06-11 RX ADMIN — LISINOPRIL SCH MG: 10 TABLET ORAL at 08:16

## 2020-06-11 RX ADMIN — POTASSIUM CHLORIDE, DEXTROSE MONOHYDRATE AND SODIUM CHLORIDE SCH MLS/HR: 150; 5; 450 INJECTION, SOLUTION INTRAVENOUS at 08:17

## 2020-06-11 RX ADMIN — ASPIRIN SCH MG: 81 TABLET, COATED ORAL at 08:16

## 2020-06-11 RX ADMIN — POTASSIUM CHLORIDE, DEXTROSE MONOHYDRATE AND SODIUM CHLORIDE SCH MLS/HR: 150; 5; 450 INJECTION, SOLUTION INTRAVENOUS at 00:34

## 2020-06-11 RX ADMIN — INSULIN LISPRO SCH UNIT: 100 INJECTION, SOLUTION INTRAVENOUS; SUBCUTANEOUS at 08:18

## 2020-06-11 RX ADMIN — INSULIN LISPRO SCH UNIT: 100 INJECTION, SOLUTION INTRAVENOUS; SUBCUTANEOUS at 11:30

## 2020-06-11 RX ADMIN — Medication PRN MG: at 08:32

## 2020-06-11 RX ADMIN — INSULIN LISPRO SCH UNIT: 100 INJECTION, SOLUTION INTRAVENOUS; SUBCUTANEOUS at 12:22

## 2020-06-11 NOTE — NUR
Nutrition Follow-up Note



RD Recommendation for Physician: 

- Continue 1800 ADA diet

- Recommend outpatient CDE education consult upon discharge



Plan of Care: RD following, monitoring for tolerance and adequacy, diet education 



Nutrition reason for involvement:

RN Consult - diet education



Primary Diagnose(s): hypokalemia, obesity, new onset DM2



PMH: no H&P 



Ht: 60 in 

Wt: 200 lb     

BMI: 39.1 kg/m2

IBW: 100 lb     



RD Assessment:

(6/11) RN consult received for diet education. Notified RN that RD has spoken with pt and 
daughter regarding diabetic diet on 6/9. RN acknowledged. Visited pt in the room. Pt ate 
well with 75% lunch intake. No GI complains. No other questions during my time of visit. 
Plan to d/c today. 



(6/9) 55 YOF admitted for hypokalemia found to have new onset DM. Pt seen today per consult 
for diet education. Pt [place] speaking only, daughter at bedside provided translation. Pt 
and daughter educated on CHO sources, serving sizes, meal planning, label reading, and foods 
to avoid. All questions and concerns addressed at time of visit and education materials 
provided. Pt's daughter reports N/V PTA, which has resolved and good intake reported. Chart 
reviewed. Labs and meds reviewed. Will continue to monitor. 



Current Diet: 1800 ADA 



Malnutrition Evaluation (6/9/20)

The patient does not meet criteria for a specified degree of malnutrition at this time. Will 
re-evaluate at follow-up as appropriate. 



Diet Education Needs Assessment:

Diet education indicated, education provided 6/9. 



Learner(s): pt, pt's daughter

Barriers: none

Cultural/Language Modifications: materials provided in [place]

Readiness: ready

Method: handouts, discussion

Topics: DM2 nutrition therapy

Understanding/Compliance: fair 



Diet tolerance: tolerating po 

     

Nutrition Care Level: low 





Signed: Concepcion Avelar, MS, RD, LD

## 2020-06-12 NOTE — DISCHARGE SUMMARY
CONSULTANT:  Lonnie Rojas MD

 

FINAL DIAGNOSES:  

1. Newly diagnosed diabetes type 2 with uncontrolled diabetes.  Blood sugar in the 500

to 700. Glycohemoglobin A1c is 11. 

2. Hypertensive urgency, much improved.

3. Headaches, improving with a negative MRI of the brain.

 

SUMMARY:  The patient is a 55-year-old female, not taking any medication at home, came

in with blood sugar in the 600 to 700.  The patient is also dehydrated.  She also having

low potassium, having headache and high blood pressure.  The patient is doing much

better now.  She is stable.  She had some education for diabetes management.  ADA diet.

The patient is also having some severe headaches, MRI of the brain is negative.  The

patient is stable today.  She will go home today with the following instructions. 

 

Glucometer, lancets, and test strips. 

 

Tresiba FlexPen 30 units at bedtime.  NovoLog FlexPen 10 units before meals.  NovoFine

needles.  Lisinopril 5 mg daily.  Midrin 100/325, one tab q.6 hours as needed for

headaches.  The 

patient instructed to follow up with Dr. Lonnie Rojas, endocrinologist in early next

week, and her primary care physician within a week.  The patient is otherwise stable,

discharged home today. 

 

 

 

 

______________________________

MD BEBE Ryan/ZHANE

D:  06/11/2020 10:30:31

T:  06/12/2020 04:53:14

Job #:  [id number]